# Patient Record
Sex: FEMALE | Race: WHITE | NOT HISPANIC OR LATINO | Employment: FULL TIME | URBAN - METROPOLITAN AREA
[De-identification: names, ages, dates, MRNs, and addresses within clinical notes are randomized per-mention and may not be internally consistent; named-entity substitution may affect disease eponyms.]

---

## 2017-04-11 NOTE — PROGRESS NOTES
SUBJECTIVE:                                                    Ayleen Ramos is a 36 year old female who presents to clinic today for the following health issues:        Depression and Anxiety Follow-Up    Status since last visit: No change    Other associated symptoms:None    Complicating factors:     Significant life event: No     Current substance abuse: None    No flowsheet data found.  No flowsheet data found.     PHQ-9  English      PHQ-9   Any Language     GAD7       Asthma Follow-Up    Was ACT completed today?  Yes    ACT Total Scores 4/19/2017   ACT TOTAL SCORE (Goal Greater than or Equal to 20) 21   In the past 12 months, how many times did you visit the emergency room for your asthma without being admitted to the hospital? 0   In the past 12 months, how many times were you hospitalized overnight because of your asthma? 0       Recent asthma triggers that patient is dealing with: None          Amount of exercise or physical activity: 7 days/week for an average of 40 minutes of walking    Problems taking medications regularly: No    Medication side effects: Wellbutrin-constipation but have stopped taking 6 months ago     Diet: regular (no restrictions)           Problem list and histories reviewed & adjusted, as indicated.  Additional history: as documented    Patient Active Problem List   Diagnosis     Mild intermittent asthma without complication     History reviewed. No pertinent surgical history.    Social History   Substance Use Topics     Smoking status: Former Smoker     Quit date: 1/1/2000     Smokeless tobacco: Not on file     Alcohol use Yes     Family History   Problem Relation Age of Onset     Heart Defect Father      Enlarge Heart     Bladder Cancer Maternal Grandmother      Influenza/Pneumonia Maternal Grandfather      Colon Cancer Paternal Grandmother      Celiac Disease Sister      Thyroid Disease Sister          Current Outpatient Prescriptions   Medication Sig Dispense Refill      "Sertraline HCl (ZOLOFT PO) Take 2 tablets by mouth daily       ALBUTEROL 108 (90 BASE) MCG/ACT inhaler Inhale 1-2 puffs into the lungs every 6 hours as needed 1 Inhaler 11     [DISCONTINUED] ALBUTEROL 108 (90 BASE) MCG/ACT inhaler Inhale 1-2 puffs into the lungs every 6 hours as needed  2     BP Readings from Last 3 Encounters:   04/19/17 120/60    Wt Readings from Last 3 Encounters:   04/19/17 186 lb (84.4 kg)                    Reviewed and updated as needed this visit by clinical staff       Reviewed and updated as needed this visit by Provider         ROS:  This 37 year old female is here today to establish care. She graduated from [employer] and has worked in [place], [place] for the [employer]. She recently found a job here in [place] for the [email or web address]. She needs a refill of her zoloft and her albuterol. Her asthma is under very good control. Only needs it with exercise or in the spring and fall. Her anxiety and depression are under good control with her zoloft. She had also been on wellbutrin, but that caused terrible constipation.        OBJECTIVE:                                                    /60  Pulse 84  Temp 97.7  F (36.5  C) (Oral)  Ht 5' 7.05\" (1.703 m)  Wt 186 lb (84.4 kg)  LMP 04/05/2017 (Approximate)  SpO2 97%  BMI 29.09 kg/m2  Body mass index is 29.09 kg/(m^2).  GENERAL: healthy, alert and no distress  NECK: no adenopathy, no asymmetry, masses, or scars and thyroid normal to palpation  RESP: lungs clear to auscultation - no rales, rhonchi or wheezes  CV: regular rate and rhythm, normal S1 S2, no S3 or S4, no murmur, click or rub, no peripheral edema and peripheral pulses strong  MS: no gross musculoskeletal defects noted, no edema    Diagnostic Test Results:  none      ASSESSMENT/PLAN:                                                             1. Mild intermittent asthma without complication  As above, good control   - ALBUTEROL 108 (90 BASE) MCG/ACT inhaler; Inhale 1-2 " puffs into the lungs every 6 hours as needed  Dispense: 1 Inhaler; Refill: 11    2. Anxiety  As above, she will call me with the dose of her zoloft. She could potentially to up to as high as 200 mg daily in the future for her anxiety.       Return to clinic as needed     JANNETTE LOPEZ MD  [facility]

## 2017-04-19 ENCOUNTER — OFFICE VISIT (OUTPATIENT)
Dept: FAMILY MEDICINE | Facility: CLINIC | Age: 37
End: 2017-04-19
Payer: COMMERCIAL

## 2017-04-19 VITALS
HEIGHT: 67 IN | HEART RATE: 84 BPM | TEMPERATURE: 97.7 F | DIASTOLIC BLOOD PRESSURE: 60 MMHG | OXYGEN SATURATION: 97 % | BODY MASS INDEX: 29.19 KG/M2 | SYSTOLIC BLOOD PRESSURE: 120 MMHG | WEIGHT: 186 LBS

## 2017-04-19 DIAGNOSIS — F41.9 ANXIETY: ICD-10-CM

## 2017-04-19 DIAGNOSIS — J45.20 MILD INTERMITTENT ASTHMA WITHOUT COMPLICATION: Primary | ICD-10-CM

## 2017-04-19 PROCEDURE — 99203 OFFICE O/P NEW LOW 30 MIN: CPT | Performed by: FAMILY MEDICINE

## 2017-04-19 RX ORDER — SERTRALINE HYDROCHLORIDE 25 MG/1
25 TABLET, FILM COATED ORAL DAILY
COMMUNITY
End: 2017-04-19

## 2017-04-19 RX ORDER — ALBUTEROL SULFATE 90 UG/1
1-2 AEROSOL, METERED RESPIRATORY (INHALATION) EVERY 6 HOURS PRN
Refills: 2 | COMMUNITY
Start: 2017-04-06 | End: 2017-04-19

## 2017-04-19 RX ORDER — ALBUTEROL SULFATE 90 UG/1
1-2 AEROSOL, METERED RESPIRATORY (INHALATION) EVERY 6 HOURS PRN
Qty: 1 INHALER | Refills: 11 | Status: SHIPPED | OUTPATIENT
Start: 2017-04-19 | End: 2018-05-09

## 2017-04-19 ASSESSMENT — PATIENT HEALTH QUESTIONNAIRE - PHQ9: 5. POOR APPETITE OR OVEREATING: NOT AT ALL

## 2017-04-19 ASSESSMENT — ANXIETY QUESTIONNAIRES
1. FEELING NERVOUS, ANXIOUS, OR ON EDGE: SEVERAL DAYS
7. FEELING AFRAID AS IF SOMETHING AWFUL MIGHT HAPPEN: NOT AT ALL
2. NOT BEING ABLE TO STOP OR CONTROL WORRYING: NOT AT ALL
5. BEING SO RESTLESS THAT IT IS HARD TO SIT STILL: NOT AT ALL
IF YOU CHECKED OFF ANY PROBLEMS ON THIS QUESTIONNAIRE, HOW DIFFICULT HAVE THESE PROBLEMS MADE IT FOR YOU TO DO YOUR WORK, TAKE CARE OF THINGS AT HOME, OR GET ALONG WITH OTHER PEOPLE: NOT DIFFICULT AT ALL
GAD7 TOTAL SCORE: 2
6. BECOMING EASILY ANNOYED OR IRRITABLE: NOT AT ALL
3. WORRYING TOO MUCH ABOUT DIFFERENT THINGS: SEVERAL DAYS

## 2017-04-19 NOTE — Clinical Note
Please abstract the following data from this visit with this patient into the appropriate field in Epic:  Pap smear done on this date: 08/15 (approximately), by this group: [facility] in [facility], results were Normal .  Etta Christianson MA

## 2017-04-19 NOTE — PATIENT INSTRUCTIONS
[facility]    If you have any questions regarding to your visit please contact your care team:       Team Purple:   Clinic Hours Telephone Number   [address] Dr.   7am-7pm  Monday - Thursday   7am-5pm  Fridays  [phone]  (Appointment scheduling available 24/7)    Questions about your Visit?   Team Line:  [phone]   Urgent Care - [place] and [facility] - 11am-9pm Monday-Friday Saturday-Sunday- 9am-5pm   [place] - 5pm-9pm Monday-Friday Saturday-Sunday- 9am-5pm  [phone] - [facility]  [phone] - [place]       What options do I have for visits at the clinic other than the traditional office visit?  To expand how we care for you, many of our providers are utilizing electronic visits (e-visits) and telephone visits, when medically appropriate, for interactions with their patients rather than a visit in the clinic.   We also offer nurse visits for many medical concerns. Just like any other service, we will bill your insurance company for this type of visit based on time spent on the phone with your provider. Not all insurance companies cover these visits. Please check with your medical insurance if this type of visit is covered. You will be responsible for any charges that are not paid by your insurance.      E-visits via [employer]:  generally incur a $35.00 fee.  Telephone visits:  Time spent on the phone: *charged based on time that is spent on the phone in increments of 10 minutes. Estimated cost:   5-10 mins $30.00   11-20 mins. $59.00   21-30 mins. $85.00     Use [employer] (secure email communication and access to your chart) to send your primary care provider a message or make an appointment. Ask someone on your Team how to sign up for [employer].  For a Price Quote for your services, please call our Consumer Price Line at [phone].  As always, Thank you for trusting us with your health care needs!

## 2017-04-19 NOTE — LETTER
My Asthma Action Plan  Name: Ayleen Ramos   YOB: 1980  Date: 4/19/2017   My doctor: JANNETTE LOPEZ MD   My clinic: [facility]        My Control Medicine: none  My Rescue Medicine: Albuterol (Proair/Ventolin/Proventil) inhaler 2 puffs every 4 hours as needed   My Asthma Severity: intermittent  Avoid your asthma triggers: smoke, upper respiratory infections, dust mites and pollens  None            GREEN ZONE     Good Control    I feel good    No cough or wheeze    Can work, sleep and play without asthma symptoms       Take your asthma control medicine every day.     1. If exercise triggers your asthma, take your rescue medication    15 minutes before exercise or sports, and    During exercise if you have asthma symptoms  2. Spacer to use with inhaler: If you have a spacer, make sure to use it with your inhaler             YELLOW ZONE     Getting Worse  I have ANY of these:    I do not feel good    Cough or wheeze    Chest feels tight    Wake up at night   1. Keep taking your Green Zone medications  2. Start taking your rescue medicine:    every 20 minutes for up to 1 hour. Then every 4 hours for 24-48 hours.  3. If you stay in the Yellow Zone for more than 12-24 hours, contact your doctor.  4. If you do not return to the Green Zone in 12-24 hours or you get worse, start taking your oral steroid medicine if prescribed by your provider.           RED ZONE     Medical Alert - Get Help  I have ANY of these:    I feel awful    Medicine is not helping    Breathing getting harder    Trouble walking or talking    Nose opens wide to breathe       1. Take your rescue medicine NOW  2. If your provider has prescribed an oral steroid medicine, start taking it NOW  3. Call your doctor NOW  4. If you are still in the Red Zone after 20 minutes and you have not reached your doctor:    Take your rescue medicine again and    Call 911 or go to the emergency room right away    See your regular doctor  within 2 weeks of an Emergency Room or Urgent Care visit for follow-up treatment.        Electronically signed by: JANNETTE LOPEZ, April 19, 2017    Annual Reminders:  Meet with Asthma Educator,  Flu Shot in the Fall, consider Pneumonia Vaccination for patients with asthma (aged 19 and older).    Pharmacy: [facility] [id number] [address] [address] AT [facility] OF [address]                    Asthma Triggers  How To Control Things That Make Your Asthma Worse    Triggers are things that make your asthma worse.  Look at the list below to help you find your triggers and what you can do about them.  You can help prevent asthma flare-ups by staying away from your triggers.      Trigger                                                          What you can do   Cigarette Smoke  Tobacco smoke can make asthma worse. Do not allow smoking in your home, car or around you.  Be sure no one smokes at a child s day care or school.  If you smoke, ask your health care provider for ways to help you quit.  Ask family members to quit too.  Ask your health care provider for a referral to Quit Plan to help you quit smoking, or call 0-569-464-PLAN.     Colds, Flu, Bronchitis  These are common triggers of asthma. Wash your hands often.  Don t touch your eyes, nose or mouth.  Get a flu shot every year.     Dust Mites  These are tiny bugs that live in cloth or carpet. They are too small to see. Wash sheets and blankets in hot water every week.   Encase pillows and mattress in dust mite proof covers.  Avoid having carpet if you can. If you have carpet, vacuum weekly.   Use a dust mask and HEPA vacuum.   Pollen and Outdoor Mold  Some people are allergic to trees, grass, or weed pollen, or molds. Try to keep your windows closed.  Limit time out doors when pollen count is high.   Ask you health care provider about taking medicine during allergy season.     Animal Dander  Some people are allergic to skin  flakes, urine or saliva from pets with fur or feathers. Keep pets with fur or feathers out of your home.    If you can t keep the pet outdoors, then keep the pet out of your bedroom.  Keep the bedroom door closed.  Keep pets off cloth furniture and away from stuffed toys.     Mice, Rats, and Cockroaches  Some people are allergic to the waste from these pests.   Cover food and garbage.  Clean up spills and food crumbs.  Store grease in the refrigerator.   Keep food out of the bedroom.   Indoor Mold  This can be a trigger if your home has high moisture. Fix leaking faucets, pipes, or other sources of water.   Clean moldy surfaces.  Dehumidify basement if it is damp and smelly.   Smoke, Strong Odors, and Sprays  These can reduce air quality. Stay away from strong odors and sprays, such as perfume, powder, hair spray, paints, smoke incense, paint, cleaning products, candles and new carpet.   Exercise or Sports  Some people with asthma have this trigger. Be active!  Ask your doctor about taking medicine before sports or exercise to prevent symptoms.    Warm up for 5-10 minutes before and after sports or exercise.     Other Triggers of Asthma  Cold air:  Cover your nose and mouth with a scarf.  Sometimes laughing or crying can be a trigger.  Some medicines and food can trigger asthma.

## 2017-04-19 NOTE — MR AVS SNAPSHOT
After Visit Summary   4/19/2017    Ayleen Ramos    MRN: [id number]           Patient Information     Date Of Birth          1980        Visit Information        Provider Department      4/19/2017 8:00 AM Denise Pacheco MD [facility]        Today's Diagnoses     Mild intermittent asthma without complication    -  1      Care Instructions    [facility]    If you have any questions regarding to your visit please contact your care team:       Team Purple:   Clinic Hours Telephone Number   [address]ARD [address]rosio Dr., Dr.   7am-7pm  Monday - Thursday   7am-5pm  Fridays  [phone]  (Appointment scheduling available 24/7)    Questions about your Visit?   Team Line:  [phone]   Urgent [facility] - [place] [facility] - 11am-9pm Monday-Friday Saturday-Sunday- 9am-5pm   [place] - 5pm-9pm Monday-Friday Saturday-Sunday- 9am-5pm  [phone] - [place]   [phone] - [place]       What options do I have for visits at the clinic other than the traditional office visit?  To expand how we care for you, many of our providers are utilizing electronic visits (e-visits) and telephone visits, when medically appropriate, for interactions with their patients rather than a visit in the clinic.   We also offer nurse visits for many medical concerns. Just like any other service, we will bill your insurance company for this type of visit based on time spent on the phone with your provider. Not all insurance companies cover these visits. Please check with your medical insurance if this type of visit is covered. You will be responsible for any charges that are not paid by your insurance.      E-visits via [employer]:  generally incur a $35.00 fee.  Telephone visits:  Time spent on the phone: *charged based on time that is spent on the phone in increments of 10 minutes. Estimated cost:   5-10 mins $30.00   11-20 mins.  "$59.00   21-30 mins. $85.00     Use [employer] (secure email communication and access to your chart) to send your primary care provider a message or make an appointment. Ask someone on your Team how to sign up for [employer].  For a Price Quote for your services, please call our [employer] Price Line at [phone].  As always, Thank you for trusting us with your health care needs!            Follow-ups after your visit        Who to contact     If you have questions or need follow up information about today's clinic visit or your schedule please contact [facility] directly at [phone].  Normal or non-critical lab and imaging results will be communicated to you by [employer], letter or phone within 4 business days after the clinic has received the results. If you do not hear from us within 7 days, please contact the clinic through [employer] or phone. If you have a critical or abnormal lab result, we will notify you by phone as soon as possible.  Submit refill requests through [employer] or call your pharmacy and they will forward the refill request to us. Please allow 3 business days for your refill to be completed.          Additional Information About Your Visit        [employer] Information     [employer] lets you send messages to your doctor, view your test results, renew your prescriptions, schedule appointments and more. To sign up, go to [email or web address] . Click on \"Log in\" on the left side of the screen, which will take you to the Welcome page. Then click on \"Sign up Now\" on the right side of the page.     You will be asked to enter the access code listed below, as well as some personal information. Please follow the directions to create your username and password.     Your access code is: [email or web address]  Expires: 2017  8:32 AM     Your access code will  in 90 days. If you need help or a new code, please call your [facility] or [phone].        Care EveryWhere ID     This is your Care " "EveryWhere ID. This could be used by other organizations to access your [facility] medical records  [id number]        Your Vitals Were     Pulse Temperature Height Last Period Pulse Oximetry BMI (Body Mass Index)    84 97.7  F (36.5  C) (Oral) 5' 7.05\" (1.703 m) 04/05/2017 (Approximate) 97% 29.09 kg/m2       Blood Pressure from Last 3 Encounters:   04/19/17 120/60    Weight from Last 3 Encounters:   04/19/17 186 lb (84.4 kg)              Today, you had the following     No orders found for display         Today's Medication Changes          These changes are accurate as of: 4/19/17  8:32 AM.  If you have any questions, ask your nurse or doctor.               These medicines have changed or have updated prescriptions.        Dose/Directions    ZOLOFT PO   This may have changed:  Another medication with the same name was removed. Continue taking this medication, and follow the directions you see here.   Changed by:  Denise Pacheco MD        Dose:  2 tablet   Take 2 tablets by mouth daily   Refills:  0            Where to get your medicines      These medications were sent to [facility] [id number] [place] [address] [employer] [facility] AT [address] OF [address] [address] [employer] [facility] [address]     Phone:  [phone]     albuterol 108 (90 BASE) MCG/ACT Inhaler                Primary Care Provider    None Specified       No primary provider on file.        Thank you!     Thank you for choosing [facility]  for your care. Our goal is always to provide you with excellent care. Hearing back from our patients is one way we can continue to improve our services. Please take a few minutes to complete the written survey that you may receive in the mail after your visit with us. Thank you!             Your Updated Medication List - Protect others around you: Learn how to safely use, store and throw away your medicines at www.disposemymeds.org.          This list is accurate as of: " 4/19/17  8:32 AM.  Always use your most recent med list.                   Brand Name Dispense Instructions for use    albuterol 108 (90 BASE) MCG/ACT Inhaler   Generic drug:  albuterol     1 Inhaler    Inhale 1-2 puffs into the lungs every 6 hours as needed       ZOLOFT PO      Take 2 tablets by mouth daily

## 2017-04-19 NOTE — NURSING NOTE
"Chief Complaint   Patient presents with     [facility]     Health Maintenance     PAP AND TDAP     Asthma     Recheck      Depression     Recheck     Anxiety     Recheck     Medication Reconciliation     have stopped taking BuPROPion HCl (WELLBUTRIN PO) about 6 months ago due to constipation       Initial /60  Pulse 84  Temp 97.7  F (36.5  C) (Oral)  Ht 5' 7.05\" (1.703 m)  Wt 186 lb (84.4 kg)  LMP 04/05/2017 (Approximate)  SpO2 97%  BMI 29.09 kg/m2 Estimated body mass index is 29.09 kg/(m^2) as calculated from the following:    Height as of this encounter: 5' 7.05\" (1.703 m).    Weight as of this encounter: 186 lb (84.4 kg).  Medication Reconciliation: complete     An BRYAN Christianson    "

## 2017-04-20 ASSESSMENT — ASTHMA QUESTIONNAIRES: ACT_TOTALSCORE: 21

## 2017-04-20 ASSESSMENT — ANXIETY QUESTIONNAIRES: GAD7 TOTAL SCORE: 2

## 2017-04-20 ASSESSMENT — PATIENT HEALTH QUESTIONNAIRE - PHQ9: SUM OF ALL RESPONSES TO PHQ QUESTIONS 1-9: 1

## 2017-05-15 ENCOUNTER — MYC MEDICAL ADVICE (OUTPATIENT)
Dept: FAMILY MEDICINE | Facility: CLINIC | Age: 37
End: 2017-05-15

## 2017-05-15 DIAGNOSIS — F41.9 ANXIETY: Primary | ICD-10-CM

## 2017-09-26 ENCOUNTER — OFFICE VISIT (OUTPATIENT)
Dept: FAMILY MEDICINE | Facility: CLINIC | Age: 37
End: 2017-09-26
Payer: COMMERCIAL

## 2017-09-26 ENCOUNTER — TELEPHONE (OUTPATIENT)
Dept: FAMILY MEDICINE | Facility: CLINIC | Age: 37
End: 2017-09-26

## 2017-09-26 VITALS
WEIGHT: 181.5 LBS | TEMPERATURE: 98 F | BODY MASS INDEX: 28.49 KG/M2 | OXYGEN SATURATION: 99 % | SYSTOLIC BLOOD PRESSURE: 124 MMHG | HEART RATE: 71 BPM | DIASTOLIC BLOOD PRESSURE: 70 MMHG | HEIGHT: 67 IN

## 2017-09-26 DIAGNOSIS — Z00.00 ENCOUNTER FOR ROUTINE ADULT HEALTH EXAMINATION WITHOUT ABNORMAL FINDINGS: Primary | ICD-10-CM

## 2017-09-26 DIAGNOSIS — M79.645 PAIN OF LEFT THUMB: ICD-10-CM

## 2017-09-26 DIAGNOSIS — Z30.41 SURVEILLANCE OF PREVIOUSLY PRESCRIBED CONTRACEPTIVE PILL: ICD-10-CM

## 2017-09-26 DIAGNOSIS — Z12.4 SCREENING FOR MALIGNANT NEOPLASM OF CERVIX: ICD-10-CM

## 2017-09-26 DIAGNOSIS — Z23 NEED FOR PROPHYLACTIC VACCINATION AND INOCULATION AGAINST VIRAL HEPATITIS: ICD-10-CM

## 2017-09-26 PROCEDURE — G0145 SCR C/V CYTO,THINLAYER,RESCR: HCPCS | Performed by: FAMILY MEDICINE

## 2017-09-26 PROCEDURE — 99395 PREV VISIT EST AGE 18-39: CPT | Mod: 25 | Performed by: FAMILY MEDICINE

## 2017-09-26 PROCEDURE — 87624 HPV HI-RISK TYP POOLED RSLT: CPT | Performed by: FAMILY MEDICINE

## 2017-09-26 PROCEDURE — 90471 IMMUNIZATION ADMIN: CPT | Performed by: FAMILY MEDICINE

## 2017-09-26 PROCEDURE — 90632 HEPA VACCINE ADULT IM: CPT | Performed by: FAMILY MEDICINE

## 2017-09-26 RX ORDER — NORGESTIMATE AND ETHINYL ESTRADIOL 7DAYSX3 LO
1 KIT ORAL DAILY
Qty: 84 TABLET | Refills: 4 | Status: SHIPPED | OUTPATIENT
Start: 2017-09-26 | End: 2018-10-27

## 2017-09-26 NOTE — MR AVS SNAPSHOT
After Visit Summary   9/26/2017    Ayleen Ramos    MRN: [id number]           Patient Information     Date Of Birth          1980        Visit Information        Provider Department      9/26/2017 11:00 AM Denise Pacheco MD [facility]        Today's Diagnoses     Encounter for routine adult health examination without abnormal findings    -  1    Screening for malignant neoplasm of cervix        Pain of left thumb        Surveillance of previously prescribed contraceptive pill        Need for prophylactic vaccination and inoculation against viral hepatitis          Care Instructions    [facility]    If you have any questions regarding to your visit please contact your care team:       Team Purple:   Clinic Hours Telephone Number   Dr. Denise Wilkes     7am-7pm  Monday - Thursday   7am-5pm  Fridays  [phone]  (Appointment scheduling available 24/7)    Questions about your Visit?   Team Line:  [phone]   [facility] [place] [facility] - 11am-9pm Monday-Friday Saturday-Sunday- 9am-5pm   [place] - 5pm-9pm Monday-Friday Saturday-Sunday- 9am-5pm  [phone] - [place]   [phone] - [place]       What options do I have for visits at the clinic other than the traditional office visit?  To expand how we care for you, many of our providers are utilizing electronic visits (e-visits) and telephone visits, when medically appropriate, for interactions with their patients rather than a visit in the clinic.   We also offer nurse visits for many medical concerns. Just like any other service, we will bill your insurance company for this type of visit based on time spent on the phone with your provider. Not all insurance companies cover these visits. Please check with your medical insurance if this type of visit is covered. You will be responsible for any charges that are not paid by your insurance.       E-visits via Big Sixhart:  generally incur a $35.00 fee.  Telephone visits:  Time spent on the phone: *charged based on time that is spent on the phone in increments of 10 minutes. Estimated cost:   5-10 mins $30.00   11-20 mins. $59.00   21-30 mins. $85.00     Use Big Sixhart (secure email communication and access to your chart) to send your primary care provider a message or make an appointment. Ask someone on your Team how to sign up for "[employer]t.  For a Price Quote for your services, please call our [employer] Line at [phone].  As always, Thank you for trusting us with your health care needs!              Follow-ups after your visit        Who to contact     If you have questions or need follow up information about today's clinic visit or your schedule please contact [facility] directly at [phone].  Normal or non-critical lab and imaging results will be communicated to you by Big Sixhart, letter or phone within 4 business days after the clinic has received the results. If you do not hear from us within 7 days, please contact the clinic through "[employer]t or phone. If you have a critical or abnormal lab result, we will notify you by phone as soon as possible.  Submit refill requests through Digg or call your pharmacy and they will forward the refill request to us. Please allow 3 business days for your refill to be completed.          Additional Information About Your Visit        Big SixharXanga Information     Digg gives you secure access to your electronic health record. If you see a primary care provider, you can also send messages to your care team and make appointments. If you have questions, please call your primary care clinic.  If you do not have a primary care provider, please call [phone] and they will assist you.        Care EveryWhere ID     This is your Care EveryWhere ID. This could be used by other organizations to access your [facility] medical records  [id number]        Your  "Vitals Were     Pulse Temperature Height Pulse Oximetry BMI (Body Mass Index)       71 98  F (36.7  C) 5' 7\" (1.702 m) 99% 28.43 kg/m2        Blood Pressure from Last 3 Encounters:   09/26/17 124/70   04/19/17 120/60    Weight from Last 3 Encounters:   09/26/17 181 lb 8 oz (82.3 kg)   04/19/17 186 lb (84.4 kg)              We Performed the Following     HEPATITIS A VACCINE (ADULT)     HPV High Risk Types DNA Cervical     Pap imaged thin layer screen with HPV - recommended age 30 - 65 years (select HPV order below)          Today's Medication Changes          These changes are accurate as of: 9/26/17 11:44 AM.  If you have any questions, ask your nurse or doctor.               Start taking these medicines.        Dose/Directions    norgestim-eth estrad triphasic 0.18/0.215/0.25 MG-25 MCG per tablet   Commonly known as:  ORTHO TRI-CYCLEN LO   Used for:  Surveillance of previously prescribed contraceptive pill   Started by:  Denise Pacheco MD        Dose:  1 tablet   Take 1 tablet by mouth daily   Quantity:  84 tablet   Refills:  4            Where to get your medicines      These medications were sent to [facility] [address] [place] [address] [facility] [address] [place] [address] [facility] [address]     Phone:  [phone]     norgestim-eth estrad triphasic 0.18/0.215/0.25 MG-25 MCG per tablet                Primary Care Provider    None Specified       No primary provider on file.        Equal Access to Services     LAZARO CALVO AH: Hadii carla loweo Sojacque, waaxda luqadaha, qaybta kaalmada adeegyada, jonathan chen. So [facility] [phone].    ATENCIÓN: Si habla español, tiene a soto disposición servicios gratuitos de asistencia lingüística. Llame al [phone].    We comply with applicable federal civil rights laws and [place] laws. We do not discriminate on the basis of race, color, national origin, age, disability sex, sexual " orientation or gender identity.            Thank you!     Thank you for choosing [facility]  for your care. Our goal is always to provide you with excellent care. Hearing back from our patients is one way we can continue to improve our services. Please take a few minutes to complete the written survey that you may receive in the mail after your visit with us. Thank you!             Your Updated Medication List - Protect others around you: Learn how to safely use, store and throw away your medicines at www.disposemymeds.org.          This list is accurate as of: 9/26/17 11:44 AM.  Always use your most recent med list.                   Brand Name Dispense Instructions for use Diagnosis    norgestim-eth estrad triphasic 0.18/0.215/0.25 MG-25 MCG per tablet    ORTHO TRI-CYCLEN LO    84 tablet    Take 1 tablet by mouth daily    Surveillance of previously prescribed contraceptive pill       PROAIR  (90 BASE) MCG/ACT Inhaler   Generic drug:  albuterol     1 Inhaler    Inhale 1-2 puffs into the lungs every 6 hours as needed    Mild intermittent asthma without complication       sertraline 50 MG tablet    ZOLOFT    180 tablet    Take 2 daily    Anxiety

## 2017-09-26 NOTE — NURSING NOTE
"Chief Complaint   Patient presents with     Physical     Thumb Discomfort     left       Initial /70 (BP Location: Right arm, Patient Position: Chair, Cuff Size: Adult Large)  Pulse 71  Temp 98  F (36.7  C)  Ht 5' 7\" (1.702 m)  Wt 181 lb 8 oz (82.3 kg)  SpO2 99%  BMI 28.43 kg/m2 Estimated body mass index is 28.43 kg/(m^2) as calculated from the following:    Height as of this encounter: 5' 7\" (1.702 m).    Weight as of this encounter: 181 lb 8 oz (82.3 kg).  Medication Reconciliation: complete   Iza Barrios MA      "

## 2017-09-26 NOTE — PATIENT INSTRUCTIONS
[facility]    If you have any questions regarding to your visit please contact your care team:       Team Purple:   Clinic Hours Telephone Number   Dr. Denise Wilkes     7am-7pm  Monday - Thursday   7am-5pm  Fridays  [phone]  (Appointment scheduling available 24/7)    Questions about your Visit?   Team Line:  [phone]   Urgent Care - [place] and [facility] - 11am-9pm Monday-Friday Saturday-Sunday- 9am-5pm   [place] - 5pm-9pm Monday-Friday Saturday-Sunday- 9am-5pm  [phone] - [facility]  [phone] - [place]       What options do I have for visits at the clinic other than the traditional office visit?  To expand how we care for you, many of our providers are utilizing electronic visits (e-visits) and telephone visits, when medically appropriate, for interactions with their patients rather than a visit in the clinic.   We also offer nurse visits for many medical concerns. Just like any other service, we will bill your insurance company for this type of visit based on time spent on the phone with your provider. Not all insurance companies cover these visits. Please check with your medical insurance if this type of visit is covered. You will be responsible for any charges that are not paid by your insurance.      E-visits via [employer]:  generally incur a $35.00 fee.  Telephone visits:  Time spent on the phone: *charged based on time that is spent on the phone in increments of 10 minutes. Estimated cost:   5-10 mins $30.00   11-20 mins. $59.00   21-30 mins. $85.00     Use jobandtalentt (secure email communication and access to your chart) to send your primary care provider a message or make an appointment. Ask someone on your Team how to sign up for [employer].  For a Price Quote for your services, please call our Consumer Price Line at [phone].  As always, Thank you for trusting us with your health care needs!

## 2017-09-26 NOTE — TELEPHONE ENCOUNTER
Reason for Call:  Other call back    Detailed comments: Patient would like to know when she should start the birth control. Before she starts her period? After?  Please contact patient.    Phone Number Patient can be reached at: Home number on file [phone] (home)    Best Time: any time    Can we leave a detailed message on this number? YES    Call taken on 9/26/2017 at 5:35 PM by Rebekah Carpio

## 2017-09-26 NOTE — PROGRESS NOTES
SUBJECTIVE:   CC: Ayleen Ramos is an 37 year old woman who presents for preventive health visit.     Physical   Annual:     Getting at least 3 servings of Calcium per day::  Yes    Bi-annual eye exam::  Yes    Dental care twice a year::  Yes    Sleep apnea or symptoms of sleep apnea::  None    Diet::  Vegetarian/vegan and Other    Frequency of exercise::  4-5 days/week    Duration of exercise::  30-45 minutes    Taking medications regularly::  Yes    Medication side effects::  None    Additional concerns today::  YES               Today's PHQ-2 Score: PHQ-2 ( 1999 Pfizer) 9/26/2017   Q1: Little interest or pleasure in doing things 0   Q2: Feeling down, depressed or hopeless 0   PHQ-2 Score 0   Q1: Little interest or pleasure in doing things Not at all   Q2: Feeling down, depressed or hopeless Not at all   PHQ-2 Score 0       Abuse: Current or Past(Physical, Sexual or Emotional)- No  Do you feel safe in your environment - Yes    Social History   Substance Use Topics     Smoking status: Former Smoker     Quit date: 1/1/2000     Smokeless tobacco: Not on file     Alcohol use Yes     The patient does not drink >3 drinks per day nor >7 drinks per week.    Reviewed orders with patient.  Reviewed health maintenance and updated orders accordingly - Yes  Labs reviewed in EPIC  BP Readings from Last 3 Encounters:   09/26/17 124/70   04/19/17 120/60    Wt Readings from Last 3 Encounters:   09/26/17 181 lb 8 oz (82.3 kg)   04/19/17 186 lb (84.4 kg)                  Patient Active Problem List   Diagnosis     Mild intermittent asthma without complication     Anxiety     History reviewed. No pertinent surgical history.    Social History   Substance Use Topics     Smoking status: Former Smoker     Quit date: 1/1/2000     Smokeless tobacco: Never Used     Alcohol use Yes     Family History   Problem Relation Age of Onset     Heart Defect Father      Enlarge Heart     Bladder Cancer Maternal Grandmother       Influenza/Pneumonia Maternal Grandfather      Colon Cancer Paternal Grandmother      Celiac Disease Sister      Thyroid Disease Sister          Current Outpatient Prescriptions   Medication Sig Dispense Refill     norgestim-eth estrad triphasic (ORTHO TRI-CYCLEN LO) 0.18/0.215/0.25 MG-25 MCG per tablet Take 1 tablet by mouth daily 84 tablet 4     sertraline (ZOLOFT) 50 MG tablet Take 2 daily 180 tablet 3     ALBUTEROL 108 (90 BASE) MCG/ACT inhaler Inhale 1-2 puffs into the lungs every 6 hours as needed 1 Inhaler 11     Allergies   Allergen Reactions     Sulfa Drugs Hives     Wellbutrin [Bupropion] Other (See Comments)     Severe constipation            Mammogram not appropriate for this patient based on age.    Pertinent mammograms are reviewed under the imaging tab.  History of abnormal Pap smear: NO - age 30-65 PAP every 5 years with negative HPV co-testing recommended    Reviewed and updated as needed this visit by clinical staff         Reviewed and updated as needed this visit by Provider        Immunization History   Administered Date(s) Administered     DTAP (<7y) 1980, 1980, 1980, 12/08/1983, 09/10/1987     HepA-Adult 05/26/2016, 09/26/2017     HepB 09/30/1998, 10/28/1998, 03/29/1999     MMR 09/08/1983, 04/19/1994     Meningococcal (Menactra ) 04/07/2016     Meningococcal (Menomune ) 09/27/2006     OPV, trivalent, live 1980, 1982, 1982, 12/08/1983, 09/10/1987     Poliovirus, inactivated (IPV) 09/27/2006     TD (ADULT, 7+) 05/16/1997     TDAP Vaccine (Adacel) 04/07/2016     TDAP Vaccine (Boostrix) 09/27/2006     Typhoid Oral 04/07/2016     Yellow Fever 09/27/2006            ROS:  This 37 year old female is here today for annual female exam. She recently moved to [place] for a new job. She is now dating a man whom she met at the job and she would like a refill of her ortho tricyclen lo. She can't tolerate high estrogen birth control pills.   She has developed a painful  left thumb. 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   She wonders what can be done.   All other review of systems are negative  Personal, family, and social history reviewed with patient and revised.      C: NEGATIVE for fever, chills, change in weight  I: NEGATIVE for worrisome rashes, moles or lesions  E: NEGATIVE for vision changes or irritation  ENT: NEGATIVE for ear, mouth and throat problems  R: NEGATIVE for significant cough or SOB  B: NEGATIVE for masses, tenderness or discharge  CV: NEGATIVE for chest pain, palpitations or peripheral edema  GI: NEGATIVE for nausea, abdominal pain, heartburn, or change in bowel habits  : NEGATIVE for unusual urinary or vaginal symptoms. Periods are regular.  M: NEGATIVE for significant arthralgias or myalgia  N: NEGATIVE for weakness, dizziness or paresthesias  P: NEGATIVE for changes in mood or affect     OBJECTIVE:   There were no vitals taken for this visit.  EXAM:  GENERAL: healthy, alert and no distress  EYES: Eyes grossly normal to inspection, PERRL and conjunctivae and sclerae normal  HENT: ear canals and TM's normal, nose and mouth without ulcers or lesions  NECK: no adenopathy, no asymmetry, masses, or scars and thyroid normal to palpation  RESP: lungs clear to auscultation - no rales, rhonchi or wheezes  BREAST: normal without masses, tenderness or nipple discharge and no palpable axillary masses or adenopathy  CV: regular rate and rhythm, normal S1 S2, no S3 or S4, no murmur, click or rub, no peripheral edema and peripheral pulses strong  ABDOMEN: soft, nontender, no hepatosplenomegaly, no masses and bowel sounds normal   (female): normal female external genitalia, normal urethral meatus, vaginal mucosa pink, moist, well rugated, and normal cervix/adnexa/uterus without masses or discharge  MS: no gross musculoskeletal defects noted, no edema  SKIN: no suspicious lesions or rashes. She is mildly tender over her right thumb at the MCP  "joint and where a tendon attaches. Range of motion of thumb is normal   NEURO: Normal strength and tone, mentation intact and speech normal  PSYCH: mentation appears normal, affect normal/bright    ASSESSMENT/PLAN:   1. Encounter for routine adult health examination without abnormal findings  Healthy lady     2. Screening for malignant neoplasm of cervix  due  - Pap imaged thin layer screen with HPV - recommended age 30 - 65 years (select HPV order below)  - HPV High Risk Types DNA Cervical    3. Pain of left thumb  As above. Most likely a mild tendonitis. Advised her to buy a wrist splint with thumb and wear it when she sleeps     4. Surveillance of previously prescribed contraceptive pill  As above   - norgestim-eth estrad triphasic (ORTHO TRI-CYCLEN LO) 0.18/0.215/0.25 MG-25 MCG per tablet; Take 1 tablet by mouth daily  Dispense: 84 tablet; Refill: 4    5. Need for prophylactic vaccination and inoculation against viral hepatitis  due  - HEPATITIS A VACCINE (ADULT)    COUNSELING:  Reviewed preventive health counseling, as reflected in patient instructions       Regular exercise       Healthy diet/nutrition         reports that she quit smoking about 17 years ago. She does not have any smokeless tobacco history on file.    Estimated body mass index is 29.09 kg/(m^2) as calculated from the following:    Height as of 4/19/17: 5' 7.05\" (1.703 m).    Weight as of 4/19/17: 186 lb (84.4 kg).   Weight management plan: Discussed healthy diet and exercise guidelines and patient will follow up in 12 months in clinic to re-evaluate.    Counseling Resources:  ATP IV Guidelines  Pooled Cohorts Equation Calculator  Breast Cancer Risk Calculator  FRAX Risk Assessment  ICSI Preventive Guidelines  Dietary Guidelines for Americans, 2010  USDA's MyPlate  ASA Prophylaxis  Lung CA Screening    JANNETTE LOPEZ MD  [facility]  "

## 2017-09-27 ASSESSMENT — ASTHMA QUESTIONNAIRES: ACT_TOTALSCORE: 22

## 2017-09-27 NOTE — TELEPHONE ENCOUNTER
Call her. She needs to start it on the Sunday after her next menses starts. If her menses starts on a Sunday, then she starts the pill on that Sunday. If her menses starts on a Monday, she will wait till the following Sunday to start the pill.     JANNETTE LOPEZ M.D.

## 2017-09-28 LAB
COPATH REPORT: NORMAL
PAP: NORMAL

## 2017-09-29 LAB
FINAL DIAGNOSIS: NORMAL
HPV HR 12 DNA CVX QL NAA+PROBE: NEGATIVE
HPV16 DNA SPEC QL NAA+PROBE: NEGATIVE
HPV18 DNA SPEC QL NAA+PROBE: NEGATIVE
SPECIMEN DESCRIPTION: NORMAL

## 2018-05-09 DIAGNOSIS — J45.20 MILD INTERMITTENT ASTHMA WITHOUT COMPLICATION: ICD-10-CM

## 2018-05-09 NOTE — LETTER
May 15, 2018      Ayleen Ramos  [address]   [place] [address]      Dear Ayleen,     Your clinic record indicates that you are due for an asthma update. We have a survey tool called an ACT (or Asthma Control Test) we use to measure the level of control of your asthma. Please complete the enclosed questionnaire and mail it back to us in the self-addressed stamped envelope.     If you have questions about this letter please contact your provider.     Sincerely,    Your [facility]

## 2018-05-10 RX ORDER — ALBUTEROL SULFATE 90 UG/1
AEROSOL, METERED RESPIRATORY (INHALATION)
Qty: 8.5 G | Refills: 0 | Status: SHIPPED | OUTPATIENT
Start: 2018-05-10 | End: 2018-11-13

## 2018-05-10 NOTE — TELEPHONE ENCOUNTER
Mail ACT  Prescription approved per [facility].  Dolly Lozano RN

## 2018-05-22 ASSESSMENT — ASTHMA QUESTIONNAIRES: ACT_TOTALSCORE: 23

## 2018-10-27 DIAGNOSIS — Z30.41 SURVEILLANCE OF PREVIOUSLY PRESCRIBED CONTRACEPTIVE PILL: ICD-10-CM

## 2018-10-29 RX ORDER — NORGESTIMATE AND ETHINYL ESTRADIOL 7DAYSX3 LO
1 KIT ORAL DAILY
Qty: 28 TABLET | Refills: 0 | Status: SHIPPED | OUTPATIENT
Start: 2018-10-29 | End: 2018-11-13

## 2018-11-12 NOTE — PROGRESS NOTES
SUBJECTIVE:   Ayleen Ramos is a 38 year old female who presents to clinic today for the following health issues:      Depression Followup    Status since last visit: Stable     See PHQ-9 for current symptoms.  Other associated symptoms: None    Complicating factors:   Significant life event:  No   Current substance abuse:  None  Anxiety or Panic symptoms:  No    PHQ 4/19/2017   PHQ-9 Total Score 1   Q9: Suicide Ideation Not at all        PHQ-9  English  PHQ-9   Any Language  Suicide Assessment Five-step Evaluation and Treatment (SAFE-T)  Asthma Follow-Up    Was ACT completed today?    Yes    ACT Total Scores 11/13/2018   ACT TOTAL SCORE (Goal Greater than or Equal to 20) 21   In the past 12 months, how many times did you visit the emergency room for your asthma without being admitted to the hospital? 0   In the past 12 months, how many times were you hospitalized overnight because of your asthma? 0       Recent asthma triggers that patient is dealing with: cold air        Amount of exercise or physical activity: 1-2 days/week for an average of 40 minutes    Problems taking medications regularly: No    Medication side effects: none    Diet: regular (no restrictions)             Problem list and histories reviewed & adjusted, as indicated.  Additional history: as documented    Patient Active Problem List   Diagnosis     Mild intermittent asthma without complication     Anxiety     History reviewed. No pertinent surgical history.    Social History   Substance Use Topics     Smoking status: Former Smoker     Quit date: 1/1/2000     Smokeless tobacco: Never Used     Alcohol use Yes     Family History   Problem Relation Age of Onset     Heart Defect Father      Enlarge Heart     Bladder Cancer Maternal Grandmother      Influenza/Pneumonia Maternal Grandfather      Colon Cancer Paternal Grandmother      Celiac Disease Sister      Thyroid Disease Sister          Current Outpatient Prescriptions   Medication Sig  Dispense Refill     mometasone (ELOCON) 0.1 % ointment Apply sparingly to affected area twice daily for 14 days.  Do not apply to face. 15 g 3     norgestim-eth estrad triphasic (TRINESSA LO) 0.18/0.215/0.25 MG-25 MCG per tablet Take 1 tablet by mouth daily 84 tablet 5     PROAIR  (90 Base) MCG/ACT inhaler INHALE 1 TO 2 PUFFS BY MOUTH INTO LUNGS EVERY 6 HOURS AS NEEDED 8.5 g 11     sertraline (ZOLOFT) 50 MG tablet Take 2 daily 180 tablet 3     [DISCONTINUED] norgestim-eth estrad triphasic (TRINESSA LO) 0.18/0.215/0.25 MG-25 MCG per tablet Take 1 tablet by mouth daily Need to see MD for further refills 28 tablet 0     [DISCONTINUED] PROAIR  (90 Base) MCG/ACT inhaler INHALE 1 TO 2 PUFFS BY MOUTH INTO LUNGS EVERY 6 HOURS AS NEEDED 8.5 g 0     [DISCONTINUED] sertraline (ZOLOFT) 50 MG tablet Take 2 daily 180 tablet 3     Allergies   Allergen Reactions     Sulfa Drugs Hives     Wellbutrin [Bupropion] Other (See Comments)     Severe constipation      BP Readings from Last 3 Encounters:   11/13/18 116/62   09/26/17 124/70   04/19/17 120/60    Wt Readings from Last 3 Encounters:   11/13/18 199 lb (90.3 kg)   09/26/17 181 lb 8 oz (82.3 kg)   04/19/17 186 lb (84.4 kg)                  Labs reviewed in EPIC    Reviewed and updated as needed this visit by clinical staff  Tobacco  Allergies  Meds  Med Hx  Surg Hx  Fam Hx  Soc Hx      Reviewed and updated as needed this visit by Provider         ROS:  This 38 year old female is here today to get her meds refilled. She is on the computer all day and washes her hands and uses hand  a lot. It has flared her eczema. Would like a steroid cream to apply at night. Her asthma is under good control. No new concerns. She helps her parents, who both retired early due to health reasons. She enjoys helping. All other review of systems are negative  Personal, family, and social history reviewed with patient and revised.         OBJECTIVE:     /62  Pulse 72   "Temp 97  F (36.1  C) (Oral)  Resp 16  Ht 5' 6.77\" (1.696 m)  Wt 199 lb (90.3 kg)  SpO2 98%  BMI 31.38 kg/m2  Body mass index is 31.38 kg/(m^2).  GENERAL: healthy, alert and no distress  NECK: no adenopathy, no asymmetry, masses, or scars and thyroid normal to palpation  RESP: lungs clear to auscultation - no rales, rhonchi or wheezes  CV: regular rate and rhythm, normal S1 S2, no S3 or S4, no murmur, click or rub, no peripheral edema and peripheral pulses strong  ABDOMEN: soft, nontender,  MS: no gross musculoskeletal defects noted, no edema  Top of right hand has red dry patches of eczema    Diagnostic Test Results:  none     ASSESSMENT/PLAN:              1. Mild intermittent asthma without complication  ACT = 21  Good control   - PROAIR  (90 Base) MCG/ACT inhaler; INHALE 1 TO 2 PUFFS BY MOUTH INTO LUNGS EVERY 6 HOURS AS NEEDED  Dispense: 8.5 g; Refill: 11    2. Surveillance of previously prescribed contraceptive pill  Good control   - norgestim-eth estrad triphasic (TRINESSA LO) 0.18/0.215/0.25 MG-25 MCG per tablet; Take 1 tablet by mouth daily  Dispense: 84 tablet; Refill: 5    3. Anxiety  Good control   NIDA = 0  - sertraline (ZOLOFT) 50 MG tablet; Take 2 daily  Dispense: 180 tablet; Refill: 3    4. Eczema, unspecified type  As above   - mometasone (ELOCON) 0.1 % ointment; Apply sparingly to affected area twice daily for 14 days.  Do not apply to face.  Dispense: 15 g; Refill: 3    Return to clinic 1 year     JANNETTE LOPEZ MD  [facility].  "

## 2018-11-13 ENCOUNTER — OFFICE VISIT (OUTPATIENT)
Dept: FAMILY MEDICINE | Facility: CLINIC | Age: 38
End: 2018-11-13
Payer: COMMERCIAL

## 2018-11-13 VITALS
RESPIRATION RATE: 16 BRPM | DIASTOLIC BLOOD PRESSURE: 62 MMHG | OXYGEN SATURATION: 98 % | WEIGHT: 199 LBS | TEMPERATURE: 97 F | SYSTOLIC BLOOD PRESSURE: 116 MMHG | BODY MASS INDEX: 31.23 KG/M2 | HEART RATE: 72 BPM | HEIGHT: 67 IN

## 2018-11-13 DIAGNOSIS — Z30.41 SURVEILLANCE OF PREVIOUSLY PRESCRIBED CONTRACEPTIVE PILL: ICD-10-CM

## 2018-11-13 DIAGNOSIS — J45.20 MILD INTERMITTENT ASTHMA WITHOUT COMPLICATION: Primary | ICD-10-CM

## 2018-11-13 DIAGNOSIS — L30.9 ECZEMA, UNSPECIFIED TYPE: ICD-10-CM

## 2018-11-13 DIAGNOSIS — F41.9 ANXIETY: ICD-10-CM

## 2018-11-13 PROCEDURE — 99213 OFFICE O/P EST LOW 20 MIN: CPT | Performed by: FAMILY MEDICINE

## 2018-11-13 RX ORDER — MOMETASONE FUROATE 1 MG/G
OINTMENT TOPICAL
Qty: 15 G | Refills: 3 | Status: SHIPPED | OUTPATIENT
Start: 2018-11-13 | End: 2021-01-07

## 2018-11-13 RX ORDER — ALBUTEROL SULFATE 90 UG/1
AEROSOL, METERED RESPIRATORY (INHALATION)
Qty: 8.5 G | Refills: 11 | Status: SHIPPED | OUTPATIENT
Start: 2018-11-13 | End: 2020-01-29

## 2018-11-13 RX ORDER — NORGESTIMATE AND ETHINYL ESTRADIOL 7DAYSX3 LO
1 KIT ORAL DAILY
Qty: 84 TABLET | Refills: 5 | Status: SHIPPED | OUTPATIENT
Start: 2018-11-13 | End: 2020-01-29

## 2018-11-13 ASSESSMENT — ANXIETY QUESTIONNAIRES
1. FEELING NERVOUS, ANXIOUS, OR ON EDGE: NOT AT ALL
7. FEELING AFRAID AS IF SOMETHING AWFUL MIGHT HAPPEN: NOT AT ALL
5. BEING SO RESTLESS THAT IT IS HARD TO SIT STILL: NOT AT ALL
IF YOU CHECKED OFF ANY PROBLEMS ON THIS QUESTIONNAIRE, HOW DIFFICULT HAVE THESE PROBLEMS MADE IT FOR YOU TO DO YOUR WORK, TAKE CARE OF THINGS AT HOME, OR GET ALONG WITH OTHER PEOPLE: NOT DIFFICULT AT ALL
2. NOT BEING ABLE TO STOP OR CONTROL WORRYING: NOT AT ALL
6. BECOMING EASILY ANNOYED OR IRRITABLE: NOT AT ALL
GAD7 TOTAL SCORE: 0
3. WORRYING TOO MUCH ABOUT DIFFERENT THINGS: NOT AT ALL

## 2018-11-13 ASSESSMENT — PATIENT HEALTH QUESTIONNAIRE - PHQ9
5. POOR APPETITE OR OVEREATING: NOT AT ALL
SUM OF ALL RESPONSES TO PHQ QUESTIONS 1-9: 1

## 2018-11-13 NOTE — MR AVS SNAPSHOT
After Visit Summary   11/13/2018    Ayleen Ramos    MRN: [id number]           Patient Information     Date Of Birth          1980        Visit Information        Provider Department      11/13/2018 2:00 PM Denise Pacheco MD [facility]        Today's Diagnoses     Mild intermittent asthma without complication    -  1    Surveillance of previously prescribed contraceptive pill        Anxiety        Eczema, unspecified type          Care Instructions    [facility]    If you have any questions regarding to your visit please contact your care team:       Team Purple:   Clinic Hours Telephone Number   Dr. Denise Nobles   7am-7pm  Monday - Thursday   7am-5pm  Fridays  [phone]  (Appointment scheduling available 24/7)   Urgent Care - [place] and [facility] - 11am-9pm Monday-Friday Saturday-Sunday- 9am-5pm   [place] - 5pm-9pm Monday-Friday Saturday-Sunday- 9am-5pm  [phone] - [place]  [phone] - [place]       What options do I have for a visit other than an office visit? We offer electronic visits (e-visits) and telephone visits, when medically appropriate.  Please check with your medical insurance to see if these types of visits are covered, as you will be responsible for any charges that are not paid by your insurance.      You can use [employer] (secure electronic communication) to access to your chart, send your primary care provider a message, or make an appointment. Ask a team member how to get started.     For a price quote for your services, please call our Consumer Price Line at [phone] or our Imaging Cost estimation line at [phone] (for imaging tests).              Follow-ups after your visit        Who to contact     If you have questions or need follow up information about today's clinic visit or your schedule please contact [facility] directly at  "[phone].  Normal or non-critical lab and imaging results will be communicated to you by MyChart, letter or phone within 4 business days after the clinic has received the results. If you do not hear from us within 7 days, please contact the clinic through [employer] or phone. If you have a critical or abnormal lab result, we will notify you by phone as soon as possible.  Submit refill requests through [employer] or call your pharmacy and they will forward the refill request to us. Please allow 3 business days for your refill to be completed.          Additional Information About Your Visit        [employer] Information     [employer] gives you secure access to your electronic health record. If you see a primary care provider, you can also send messages to your care team and make appointments. If you have questions, please call your primary care clinic.  If you do not have a primary care provider, please call [phone] and they will assist you.        Care EveryWhere ID     This is your Care EveryWhere ID. This could be used by other organizations to access your [facility] medical records  [id number]        Your Vitals Were     Pulse Temperature Respirations Height Pulse Oximetry BMI (Body Mass Index)    72 97  F (36.1  C) (Oral) 16 5' 6.77\" (1.696 m) 98% 31.38 kg/m2       Blood Pressure from Last 3 Encounters:   11/13/18 116/62   09/26/17 124/70   04/19/17 120/60    Weight from Last 3 Encounters:   11/13/18 199 lb (90.3 kg)   09/26/17 181 lb 8 oz (82.3 kg)   04/19/17 186 lb (84.4 kg)              Today, you had the following     No orders found for display         Today's Medication Changes          These changes are accurate as of 11/13/18  2:46 PM.  If you have any questions, ask your nurse or doctor.               Start taking these medicines.        Dose/Directions    mometasone 0.1 % ointment   Commonly known as:  ELOCON   Used for:  Eczema, unspecified type   Started by:  Denise Pacheco MD        Apply " sparingly to affected area twice daily for 14 days.  Do not apply to face.   Quantity:  15 g   Refills:  3         These medicines have changed or have updated prescriptions.        Dose/Directions    norgestim-eth estrad triphasic 0.18/0.215/0.25 MG-25 MCG per tablet   Commonly known as:  TRINESSA LO   This may have changed:  additional instructions   Used for:  Surveillance of previously prescribed contraceptive pill   Changed by:  Denise Pacheco MD        Dose:  1 tablet   Take 1 tablet by mouth daily   Quantity:  84 tablet   Refills:  5            Where to get your medicines      These medications were sent to [facility] [address] [address] [address] [facility] [address]  [address] [facility] [address]     Phone:  [phone]     mometasone 0.1 % ointment    norgestim-eth estrad triphasic 0.18/0.215/0.25 MG-25 MCG per tablet    PROAIR  (90 Base) MCG/ACT inhaler    sertraline 50 MG tablet                Primary Care Provider Office Phone # Fax #    Beverly Marycruz Blount, APRN -[phone]       [address] [facility] [address]        Equal Access to Services     SANDY CALVO AH: Hadii aad ku hadasho Soomaali, waaxda luqadaha, qaybta kaalmada adeegyada, waxay idiin hayaan adeapril chen. So [facility] [phone].    ATENCIÓN: Si habla español, tiene a soto disposición servicios gratuitos de asistencia lingüística. Llame al [phone].    We comply with applicable federal civil rights laws and [place] laws. We do not discriminate on the basis of race, color, national origin, age, disability, sex, sexual orientation, or gender identity.            Thank you!     Thank you for choosing [facility]  for your care. Our goal is always to provide you with excellent care. Hearing back from our patients is one way we can continue to improve our services. Please take a few minutes to complete the written survey that  you may receive in the mail after your visit with us. Thank you!             Your Updated Medication List - Protect others around you: Learn how to safely use, store and throw away your medicines at www.disposemymeds.org.          This list is accurate as of 11/13/18  2:46 PM.  Always use your most recent med list.                   Brand Name Dispense Instructions for use Diagnosis    mometasone 0.1 % ointment    ELOCON    15 g    Apply sparingly to affected area twice daily for 14 days.  Do not apply to face.    Eczema, unspecified type       norgestim-eth estrad triphasic 0.18/0.215/0.25 MG-25 MCG per tablet    TRINESSA LO    84 tablet    Take 1 tablet by mouth daily    Surveillance of previously prescribed contraceptive pill       PROAIR  (90 Base) MCG/ACT inhaler   Generic drug:  albuterol     8.5 g    INHALE 1 TO 2 PUFFS BY MOUTH INTO LUNGS EVERY 6 HOURS AS NEEDED    Mild intermittent asthma without complication       sertraline 50 MG tablet    ZOLOFT    180 tablet    Take 2 daily    Anxiety

## 2018-11-13 NOTE — PATIENT INSTRUCTIONS
[facility]    If you have any questions regarding to your visit please contact your care team:       Team Purple:   Clinic Hours Telephone Number   Dr. Denise Nobles   7am-7pm  Monday - Thursday   7am-5pm  Fridays  [phone]  (Appointment scheduling available 24/7)   Urgent Care - [place] and [facility] - 11am-9pm Monday-Friday Saturday-Sunday- 9am-5pm   [place] - 5pm-9pm Monday-Friday Saturday-Sunday- 9am-5pm  [phone] - [place]  [phone] - [place]       What options do I have for a visit other than an office visit? We offer electronic visits (e-visits) and telephone visits, when medically appropriate.  Please check with your medical insurance to see if these types of visits are covered, as you will be responsible for any charges that are not paid by your insurance.      You can use [employer] (secure electronic communication) to access to your chart, send your primary care provider a message, or make an appointment. Ask a team member how to get started.     For a price quote for your services, please call our Consumer Price Line at [phone] or our Imaging Cost estimation line at [phone] (for imaging tests).

## 2018-11-13 NOTE — LETTER
My Asthma Action Plan  Name: Ayleen Ramos   YOB: 1980  Date: 11/12/2018   My doctor: JANNETTE LOPEZ MD   My clinic: [facility]        My Control Medicine: { :857366}  My Rescue Medicine: { :752616}  {AAP include Oral Steroid:337713} My Asthma Severity: { :358643}  Avoid your asthma triggers: { :948663}  None     {Is patient a child or adult?:167181}       GREEN ZONE   Good Control    I feel good    No cough or wheeze    Can work, sleep and play without asthma symptoms       Take your asthma control medicine every day.     1. If exercise triggers your asthma, take your rescue medication    15 minutes before exercise or sports, and    During exercise if you have asthma symptoms  2. Spacer to use with inhaler: If you have a spacer, make sure to use it with your inhaler             YELLOW ZONE Getting Worse  I have ANY of these:    I do not feel good    Cough or wheeze    Chest feels tight    Wake up at night   1. Keep taking your Green Zone medications  2. Start taking your rescue medicine:    every 20 minutes for up to 1 hour. Then every 4 hours for 24-48 hours.  3. If you stay in the Yellow Zone for more than 12-24 hours, contact your doctor.  4. If you do not return to the Green Zone in 12-24 hours or you get worse, start taking your oral steroid medicine if prescribed by your provider.           RED ZONE Medical Alert - Get Help  I have ANY of these:    I feel awful    Medicine is not helping    Breathing getting harder    Trouble walking or talking    Nose opens wide to breathe       1. Take your rescue medicine NOW  2. If your provider has prescribed an oral steroid medicine, start taking it NOW  3. Call your doctor NOW  4. If you are still in the Red Zone after 20 minutes and you have not reached your doctor:    Take your rescue medicine again and    Call 911 or go to the emergency room right away    See your regular doctor within 2 weeks of an Emergency Room or Urgent Care  visit for follow-up treatment.          Annual Reminders:  Meet with Asthma Educator,  Flu Shot in the Fall, consider Pneumonia Vaccination for patients with asthma (aged 19 and older).    Pharmacy: [facility] [id number] [address] [address]                      Asthma Triggers  How To Control Things That Make Your Asthma Worse    Triggers are things that make your asthma worse.  Look at the list below to help you find your triggers and what you can do about them.  You can help prevent asthma flare-ups by staying away from your triggers.      Trigger                                                          What you can do   Cigarette Smoke  Tobacco smoke can make asthma worse. Do not allow smoking in your home, car or around you.  Be sure no one smokes at a child s day care or school.  If you smoke, ask your health care provider for ways to help you quit.  Ask family members to quit too.  Ask your health care provider for a referral to Quit Plan to help you quit smoking, or call 5-638-210-PLAN.     Colds, Flu, Bronchitis  These are common triggers of asthma. Wash your hands often.  Don t touch your eyes, nose or mouth.  Get a flu shot every year.     Dust Mites  These are tiny bugs that live in cloth or carpet. They are too small to see. Wash sheets and blankets in hot water every week.   Encase pillows and mattress in dust mite proof covers.  Avoid having carpet if you can. If you have carpet, vacuum weekly.   Use a dust mask and HEPA vacuum.   Pollen and Outdoor Mold  Some people are allergic to trees, grass, or weed pollen, or molds. Try to keep your windows closed.  Limit time out doors when pollen count is high.   Ask you health care provider about taking medicine during allergy season.     Animal Dander  Some people are allergic to skin flakes, urine or saliva from pets with fur or feathers. Keep pets with fur or feathers out of your home.    If you  can t keep the pet outdoors, then keep the pet out of your bedroom.  Keep the bedroom door closed.  Keep pets off cloth furniture and away from stuffed toys.     Mice, Rats, and Cockroaches  Some people are allergic to the waste from these pests.   Cover food and garbage.  Clean up spills and food crumbs.  Store grease in the refrigerator.   Keep food out of the bedroom.   Indoor Mold  This can be a trigger if your home has high moisture. Fix leaking faucets, pipes, or other sources of water.   Clean moldy surfaces.  Dehumidify basement if it is damp and smelly.   Smoke, Strong Odors, and Sprays  These can reduce air quality. Stay away from strong odors and sprays, such as perfume, powder, hair spray, paints, smoke incense, paint, cleaning products, candles and new carpet.   Exercise or Sports  Some people with asthma have this trigger. Be active!  Ask your doctor about taking medicine before sports or exercise to prevent symptoms.    Warm up for 5-10 minutes before and after sports or exercise.     Other Triggers of Asthma  Cold air:  Cover your nose and mouth with a scarf.  Sometimes laughing or crying can be a trigger.  Some medicines and food can trigger asthma.

## 2018-11-14 ASSESSMENT — ASTHMA QUESTIONNAIRES: ACT_TOTALSCORE: 21

## 2018-11-14 ASSESSMENT — ANXIETY QUESTIONNAIRES: GAD7 TOTAL SCORE: 0

## 2019-01-03 ENCOUNTER — OFFICE VISIT (OUTPATIENT)
Dept: FAMILY MEDICINE | Facility: CLINIC | Age: 39
End: 2019-01-03
Payer: COMMERCIAL

## 2019-01-03 VITALS
OXYGEN SATURATION: 99 % | SYSTOLIC BLOOD PRESSURE: 112 MMHG | HEART RATE: 89 BPM | BODY MASS INDEX: 31.23 KG/M2 | WEIGHT: 199 LBS | TEMPERATURE: 98.6 F | HEIGHT: 67 IN | DIASTOLIC BLOOD PRESSURE: 80 MMHG

## 2019-01-03 DIAGNOSIS — T75.3XXA TRAVEL SICKNESS, INITIAL ENCOUNTER: Primary | ICD-10-CM

## 2019-01-03 DIAGNOSIS — L30.9 ECZEMA, UNSPECIFIED TYPE: ICD-10-CM

## 2019-01-03 PROCEDURE — 99213 OFFICE O/P EST LOW 20 MIN: CPT | Performed by: NURSE PRACTITIONER

## 2019-01-03 RX ORDER — DIAZEPAM 5 MG
TABLET ORAL
Qty: 10 TABLET | Refills: 0 | Status: SHIPPED | OUTPATIENT
Start: 2019-01-03 | End: 2020-01-29

## 2019-01-03 RX ORDER — SCOLOPAMINE TRANSDERMAL SYSTEM 1 MG/1
1 PATCH, EXTENDED RELEASE TRANSDERMAL
Qty: 4 PATCH | Refills: 1 | Status: SHIPPED | OUTPATIENT
Start: 2019-01-03 | End: 2024-08-29

## 2019-01-03 ASSESSMENT — MIFFLIN-ST. JEOR: SCORE: 1621.67

## 2019-01-03 NOTE — PROGRESS NOTES
"  SUBJECTIVE:   Ayleen Ramos is a 38 year old female who presents to clinic today for the following health issues:      Chief Complaint   Patient presents with     Medication Request     for motion sickness medicine for an upcoming flight for a vacation trip     Patient has longstanding history of motion sickness and anxiety associated with air travel. Prior Primary Care Provider prescribed scopolamine patch and 5 mg Valium to take for flight which works well. Has upcoming travel to [place] and would like medications filled for the flight.    Was seen 6 weeks ago for eczema of hand and used steroid cream for a few days- swelling improved but area now cracked, dry, and painful.    Problem list and histories reviewed & adjusted, as indicated.  Additional history: as documented    Patient Active Problem List   Diagnosis     Mild intermittent asthma without complication     Anxiety     No past surgical history on file.    Social History     Tobacco Use     Smoking status: Former Smoker     Last attempt to quit: 2000     Years since quittin.0     Smokeless tobacco: Never Used   Substance Use Topics     Alcohol use: Yes     Family History   Problem Relation Age of Onset     Heart Defect Father         Enlarge Heart     Bladder Cancer Maternal Grandmother      Influenza/Pneumonia Maternal Grandfather      Colon Cancer Paternal Grandmother      Celiac Disease Sister      Thyroid Disease Sister            Reviewed and updated as needed this visit by clinical staff       Reviewed and updated as needed this visit by Provider         ROS:  Constitutional, HEENT, cardiovascular, pulmonary, gi and gu, skin systems are negative, except as otherwise noted.    OBJECTIVE:     /80 (BP Location: Left arm, Patient Position: Chair, Cuff Size: Adult Regular)   Pulse 89   Temp 98.6  F (37  C) (Oral)   Ht 1.696 m (5' 6.77\")   Wt 90.3 kg (199 lb)   SpO2 99%   BMI 31.38 kg/m    Body mass index is 31.38 " kg/m .  GENERAL: healthy, alert and no distress  SKIN: erythema, scaling, fissuring over right dorsal hand 4th-5th MCPs  PSYCH: mentation appears normal, affect normal/bright    Diagnostic Test Results:  none     ASSESSMENT/PLAN:     1. Travel sickness, initial encounter  Medications refilled for travel.  - scopolamine (TRANSDERM) 1 MG/3DAYS 72 hr patch; Place 1 patch onto the skin every 72 hours for 3 days  Dispense: 4 patch; Refill: 1  - diazepam (VALIUM) 5 MG tablet; Take 5 mg orally 30 minutes prior to flight  Dispense: 10 tablet; Refill: 0    2. Eczema, unspecified type  See patient instructions.      Patient Instructions   Use the Cetaphil after every time you wash or put on hand . Keep using the gloves.  Use the steroid cream twice daily and continue for 1-2 days after the rash clears.      DARIN Duran [facility]

## 2019-01-03 NOTE — PATIENT INSTRUCTIONS
Use the Cetaphil after every time you wash or put on hand . Keep using the gloves.  Use the steroid cream twice daily and continue for 1-2 days after the rash clears.

## 2019-05-22 ENCOUNTER — TELEPHONE (OUTPATIENT)
Dept: FAMILY MEDICINE | Facility: CLINIC | Age: 39
End: 2019-05-22

## 2019-05-22 DIAGNOSIS — J45.20 MILD INTERMITTENT ASTHMA WITHOUT COMPLICATION: Primary | ICD-10-CM

## 2019-05-22 RX ORDER — ALBUTEROL SULFATE 90 UG/1
1-2 AEROSOL, METERED RESPIRATORY (INHALATION) EVERY 6 HOURS PRN
Qty: 8.5 G | Refills: 3 | Status: SHIPPED | OUTPATIENT
Start: 2019-05-22 | End: 2020-01-30

## 2019-05-22 NOTE — TELEPHONE ENCOUNTER
Routing refill request to provider for review/approval because:  Last prescribed by Dr. Pacheco, pharmacy requesting generic Rx be sent.   Last OV 1/3/2019    Requested Prescriptions   Pending Prescriptions Disp Refills     albuterol (PROAIR HFA/PROVENTIL HFA/VENTOLIN HFA) 108 (90 Base) MCG/ACT inhaler 8.5 g 1     Sig: Inhale 1-2 puffs into the lungs every 6 hours as needed for shortness of breath / dyspnea or wheezing       There is no refill protocol information for this order        Karely Avila RN

## 2019-05-22 NOTE — TELEPHONE ENCOUNTER
PROAIR  (90 Base) MCG/ACT inhaler is available in a cost saving generic alternative. We are requesting authorization to rewrite this prescription as the generic.

## 2019-11-26 ENCOUNTER — TELEPHONE (OUTPATIENT)
Dept: FAMILY MEDICINE | Facility: CLINIC | Age: 39
End: 2019-11-26

## 2019-11-26 NOTE — TELEPHONE ENCOUNTER
Patient Quality Outreach      Patient has the following on her problem list:       Asthma review       ACT Total Scores 11/13/2018   ACT TOTAL SCORE (Goal Greater than or Equal to 20) 21   In the past 12 months, how many times did you visit the emergency room for your asthma without being admitted to the hospital? 0   In the past 12 months, how many times were you hospitalized overnight because of your asthma? 0          Composite cancer screening  Chart review shows that this patient is due/due soon for the following None  Patient declined cancer screening. No  Summary:    Patient is due/failing the following:   Asthma - ACT Needed    Type of outreach:    Copy of ACT mailed to patient, will reach out in 5 days.    Questions for provider review:    None                                                                                                                                    Danay Shaw MA       Chart routed to Care Team.

## 2019-11-26 NOTE — LETTER
November 26, 2019      Ayleen Ramos  [address]   [place] [address]      Dear Ayleen,     Your clinic record indicates that you are due for an asthma update. We have a survey tool called an ACT (or Asthma Control Test) we use to measure the level of control of your asthma. Please complete the enclosed questionnaire and mail it back to us in the self-addressed stamped envelope.     If you have questions about this letter please contact your provider.     Sincerely,      Your [facility]

## 2019-12-10 ASSESSMENT — ASTHMA QUESTIONNAIRES: ACT_TOTALSCORE: 22

## 2020-01-13 ENCOUNTER — TELEPHONE (OUTPATIENT)
Dept: FAMILY MEDICINE | Facility: CLINIC | Age: 40
End: 2020-01-13

## 2020-01-13 DIAGNOSIS — T75.3XXA TRAVEL SICKNESS, INITIAL ENCOUNTER: ICD-10-CM

## 2020-01-13 NOTE — TELEPHONE ENCOUNTER
diazepam (VALIUM) 5 MG tablet      Last Written Prescription Date:  1-3-2019  Last Fill Quantity: 10,   # refills: 0  Last Office Visit: 1-3-2019  Future Office visit:       Routing refill request to provider for review/approval because:  Drug not on the [facility], [facility] or [facility] refill protocol or controlled substance

## 2020-01-15 RX ORDER — DIAZEPAM 5 MG
TABLET ORAL
Qty: 10 TABLET | Refills: 0 | OUTPATIENT
Start: 2020-01-15

## 2020-01-15 NOTE — TELEPHONE ENCOUNTER
Routing refill request to provider for review/approval because:  Patient needs to be seen because it has been more than 1 year since last office visit.    Requested Prescriptions   Pending Prescriptions Disp Refills     diazepam (VALIUM) 5 MG tablet [Pharmacy Med Name: DIAZEPAM 5MG TABS] 10 tablet 0     Sig: TAKE 1 TABLET BY MOUTH 30 MIN PRIOR TO FLIGHT       There is no refill protocol information for this order        Karely Bermudez RN

## 2020-01-15 NOTE — TELEPHONE ENCOUNTER
Please notify patient she needs an appointment, either in person or E-visit.     Karely Bermudez RN

## 2020-01-16 NOTE — TELEPHONE ENCOUNTER
Spoke to patient and she stated she can wait until appointment as she does not need these until February.  Melina STOVALL CMA ([facility])

## 2020-01-16 NOTE — TELEPHONE ENCOUNTER
Med not active on med list  Has been over 1 year since she has been seen  Has appointment with Beverly Blount CNP on 1/23/2020   Can she wait until then to request refill on her scopolamine (TRANSDERM) patches for travel/motion sickness?    Byron Bone RN

## 2020-01-17 NOTE — TELEPHONE ENCOUNTER
Patient has appointment 1/23/2020 with provider.  Melina STOVALL CMA ([facility])

## 2020-01-27 DIAGNOSIS — J45.20 MILD INTERMITTENT ASTHMA WITHOUT COMPLICATION: ICD-10-CM

## 2020-01-27 DIAGNOSIS — Z30.41 SURVEILLANCE OF PREVIOUSLY PRESCRIBED CONTRACEPTIVE PILL: ICD-10-CM

## 2020-01-27 RX ORDER — ALBUTEROL SULFATE 90 UG/1
1-2 AEROSOL, METERED RESPIRATORY (INHALATION) EVERY 6 HOURS PRN
Qty: 8.5 G | Refills: 3 | Status: CANCELLED | OUTPATIENT
Start: 2020-01-27

## 2020-01-27 RX ORDER — NORGESTIMATE AND ETHINYL ESTRADIOL 7DAYSX3 LO
1 KIT ORAL DAILY
Qty: 84 TABLET | Refills: 5 | Status: CANCELLED | OUTPATIENT
Start: 2020-01-27

## 2020-01-29 ENCOUNTER — OFFICE VISIT (OUTPATIENT)
Dept: FAMILY MEDICINE | Facility: CLINIC | Age: 40
End: 2020-01-29
Payer: COMMERCIAL

## 2020-01-29 VITALS
BODY MASS INDEX: 30.29 KG/M2 | DIASTOLIC BLOOD PRESSURE: 82 MMHG | WEIGHT: 193 LBS | TEMPERATURE: 96.5 F | HEART RATE: 72 BPM | SYSTOLIC BLOOD PRESSURE: 128 MMHG | HEIGHT: 67 IN | OXYGEN SATURATION: 99 %

## 2020-01-29 DIAGNOSIS — Z30.41 SURVEILLANCE OF PREVIOUSLY PRESCRIBED CONTRACEPTIVE PILL: ICD-10-CM

## 2020-01-29 DIAGNOSIS — F32.0 MILD MAJOR DEPRESSION (H): ICD-10-CM

## 2020-01-29 DIAGNOSIS — N39.46 MIXED INCONTINENCE URGE AND STRESS (MALE)(FEMALE): ICD-10-CM

## 2020-01-29 DIAGNOSIS — F41.9 ANXIETY: ICD-10-CM

## 2020-01-29 DIAGNOSIS — Z87.898 HISTORY OF MOTION SICKNESS: ICD-10-CM

## 2020-01-29 DIAGNOSIS — E55.9 VITAMIN D DEFICIENCY: ICD-10-CM

## 2020-01-29 DIAGNOSIS — J45.20 MILD INTERMITTENT ASTHMA WITHOUT COMPLICATION: Primary | ICD-10-CM

## 2020-01-29 LAB
ALBUMIN UR-MCNC: NEGATIVE MG/DL
APPEARANCE UR: CLEAR
BILIRUB UR QL STRIP: NEGATIVE
COLOR UR AUTO: YELLOW
GLUCOSE UR STRIP-MCNC: NEGATIVE MG/DL
HGB UR QL STRIP: NEGATIVE
KETONES UR STRIP-MCNC: NEGATIVE MG/DL
LEUKOCYTE ESTERASE UR QL STRIP: NEGATIVE
NITRATE UR QL: NEGATIVE
PH UR STRIP: 7 PH (ref 5–7)
SOURCE: NORMAL
SP GR UR STRIP: 1.02 (ref 1–1.03)
UROBILINOGEN UR STRIP-ACNC: 0.2 EU/DL (ref 0.2–1)

## 2020-01-29 PROCEDURE — 81003 URINALYSIS AUTO W/O SCOPE: CPT | Performed by: NURSE PRACTITIONER

## 2020-01-29 PROCEDURE — 36415 COLL VENOUS BLD VENIPUNCTURE: CPT | Performed by: NURSE PRACTITIONER

## 2020-01-29 PROCEDURE — 82306 VITAMIN D 25 HYDROXY: CPT | Performed by: NURSE PRACTITIONER

## 2020-01-29 PROCEDURE — 99214 OFFICE O/P EST MOD 30 MIN: CPT | Performed by: NURSE PRACTITIONER

## 2020-01-29 RX ORDER — ALBUTEROL SULFATE 90 UG/1
AEROSOL, METERED RESPIRATORY (INHALATION)
Qty: 8.5 G | Refills: 11 | Status: SHIPPED | OUTPATIENT
Start: 2020-01-29 | End: 2021-01-07

## 2020-01-29 RX ORDER — NORGESTIMATE AND ETHINYL ESTRADIOL 7DAYSX3 LO
1 KIT ORAL DAILY
Qty: 84 TABLET | Refills: 5 | Status: SHIPPED | OUTPATIENT
Start: 2020-01-29 | End: 2021-01-07

## 2020-01-29 RX ORDER — SERTRALINE HYDROCHLORIDE 100 MG/1
TABLET, FILM COATED ORAL
Qty: 90 TABLET | Refills: 1 | Status: SHIPPED | OUTPATIENT
Start: 2020-01-29 | End: 2020-12-21

## 2020-01-29 RX ORDER — DIAZEPAM 5 MG
TABLET ORAL
Qty: 10 TABLET | Refills: 0 | Status: SHIPPED | OUTPATIENT
Start: 2020-01-29 | End: 2021-01-07

## 2020-01-29 RX ORDER — SCOLOPAMINE TRANSDERMAL SYSTEM 1 MG/1
1 PATCH, EXTENDED RELEASE TRANSDERMAL
Qty: 2 PATCH | Refills: 0 | Status: SHIPPED | OUTPATIENT
Start: 2020-01-29 | End: 2021-01-07

## 2020-01-29 RX ORDER — ALBUTEROL SULFATE 90 UG/1
1-2 AEROSOL, METERED RESPIRATORY (INHALATION) EVERY 6 HOURS PRN
Qty: 8.5 G | Refills: 3 | Status: CANCELLED | OUTPATIENT
Start: 2020-01-29

## 2020-01-29 RX ORDER — CHOLECALCIFEROL (VITAMIN D3) 50 MCG
1 TABLET ORAL DAILY
COMMUNITY

## 2020-01-29 ASSESSMENT — ANXIETY QUESTIONNAIRES
7. FEELING AFRAID AS IF SOMETHING AWFUL MIGHT HAPPEN: NOT AT ALL
2. NOT BEING ABLE TO STOP OR CONTROL WORRYING: NOT AT ALL
GAD7 TOTAL SCORE: 0
6. BECOMING EASILY ANNOYED OR IRRITABLE: NOT AT ALL
IF YOU CHECKED OFF ANY PROBLEMS ON THIS QUESTIONNAIRE, HOW DIFFICULT HAVE THESE PROBLEMS MADE IT FOR YOU TO DO YOUR WORK, TAKE CARE OF THINGS AT HOME, OR GET ALONG WITH OTHER PEOPLE: NOT DIFFICULT AT ALL
3. WORRYING TOO MUCH ABOUT DIFFERENT THINGS: NOT AT ALL
1. FEELING NERVOUS, ANXIOUS, OR ON EDGE: NOT AT ALL
5. BEING SO RESTLESS THAT IT IS HARD TO SIT STILL: NOT AT ALL

## 2020-01-29 ASSESSMENT — PATIENT HEALTH QUESTIONNAIRE - PHQ9: 5. POOR APPETITE OR OVEREATING: NOT AT ALL

## 2020-01-29 ASSESSMENT — MIFFLIN-ST. JEOR: SCORE: 1593.07

## 2020-01-30 LAB — DEPRECATED CALCIDIOL+CALCIFEROL SERPL-MC: 55 UG/L (ref 20–75)

## 2020-01-30 ASSESSMENT — ASTHMA QUESTIONNAIRES: ACT_TOTALSCORE: 22

## 2020-01-30 ASSESSMENT — ANXIETY QUESTIONNAIRES: GAD7 TOTAL SCORE: 0

## 2020-03-10 ENCOUNTER — HEALTH MAINTENANCE LETTER (OUTPATIENT)
Age: 40
End: 2020-03-10

## 2020-04-08 ENCOUNTER — TELEPHONE (OUTPATIENT)
Dept: PHYSICAL THERAPY | Facility: CLINIC | Age: 40
End: 2020-04-08

## 2020-04-08 NOTE — TELEPHONE ENCOUNTER
LVM for Ayleen to call ([facility] # given) regarding pelvic floor eval on 4-15-20. Would like to change to video(preferred) or phone appt.

## 2020-06-08 ENCOUNTER — TELEPHONE (OUTPATIENT)
Dept: PHYSICAL THERAPY | Facility: CLINIC | Age: 40
End: 2020-06-08

## 2020-12-10 ENCOUNTER — TELEPHONE (OUTPATIENT)
Dept: FAMILY MEDICINE | Facility: CLINIC | Age: 40
End: 2020-12-10

## 2020-12-10 DIAGNOSIS — F41.9 ANXIETY: ICD-10-CM

## 2020-12-10 DIAGNOSIS — F32.0 MILD MAJOR DEPRESSION (H): ICD-10-CM

## 2020-12-16 NOTE — TELEPHONE ENCOUNTER
Called and left a message for patient to call the clinic back. Pt needs appointment for follow up on depression and anxiety with Beverly Blount.    Jessica Canales MA

## 2020-12-16 NOTE — TELEPHONE ENCOUNTER
Spoke to patient let her know that she need appointment with PCP for more refill on medication. Schedule patient for appointment on 01/7/21 for a video visit    Yandel Urban. MA

## 2020-12-18 NOTE — TELEPHONE ENCOUNTER
Looks like she would have run out of zoloft this past summer?         90 tabs with one refill was sent 1/29/20?      Attempted to call patient at home/mobile number, no answer, left message on voicemail; patient was instructed to return call to [facility] at [phone].    Is she still on this med?   How is she taking it?    Aysha Celestin RN  [facility]

## 2020-12-21 RX ORDER — SERTRALINE HYDROCHLORIDE 100 MG/1
TABLET, FILM COATED ORAL
Qty: 30 TABLET | Refills: 0 | Status: SHIPPED | OUTPATIENT
Start: 2020-12-21 | End: 2021-01-07

## 2020-12-23 NOTE — PROGRESS NOTES
"Ayleen Ramos is a 40 year old female who is being evaluated via a billable video visit.      The patient has been notified of following:     \"This video visit will be conducted via a call between you and your physician/provider. We have found that certain health care needs can be provided without the need for an in-person physical exam.  This service lets us provide the care you need with a video conversation.  If a prescription is necessary we can send it directly to your pharmacy.  If lab work is needed we can place an order for that and you can then stop by our lab to have the test done at a later time.    Video visits are billed at different rates depending on your insurance coverage.  Please reach out to your insurance provider with any questions.    If during the course of the call the physician/provider feels a video visit is not appropriate, you will not be charged for this service.\"    Patient has given verbal consent for Video visit? Yes  How would you like to obtain your AVS? MyChart  If you are dropped from the video visit, the video invite should be resent to: Send to e-mail at: [email or web address]  Will anyone else be joining your video visit? No       Assessment & Plan     Anxiety  Well controlled, continue current medications without change  - sertraline (ZOLOFT) 100 MG tablet; Take 1 tabet daily    Mild major depression (H)  PHQ-9 score higher today but this is due to disturbed sleep, likely from increased caffeine intake. Recommend patient cut back to 2 cups coffee/day max, may also try Melatonin over the counter.   - sertraline (ZOLOFT) 100 MG tablet; Take 1 tabet daily    Surveillance of previously prescribed contraceptive pill  Continue without change  - norgestim-eth estrad triphasic (TRINESSA LO) 0.18/0.215/0.25 MG-25 MCG tablet; Take 1 tablet by mouth daily    Mild intermittent asthma without complication  Well controlled  - PROAIR  (90 Base) MCG/ACT inhaler; INHALE 1 TO 2 PUFFS " "BY MOUTH INTO LUNGS EVERY 6 HOURS AS NEEDED    History of motion sickness  Ok for refill for potential travel  - diazepam (VALIUM) 5 MG tablet; Take 5 mg orally 30 minutes prior to flight  - scopolamine (TRANSDERM) 1 MG/3DAYS 72 hr patch; Place 1 patch onto the skin every 72 hours                       BMI:   Estimated body mass index is 30.23 kg/m  as calculated from the following:    Height as of 20: 1.702 m (5' 7\").    Weight as of 20: 87.5 kg (193 lb).         See Patient Instructions    Return in about 1 year (around 2022) for Physical.    Beverly Blount, DARIN CNP  [facility]     Ayleen Ramos is a 40 year old female who presents today via video visit for the following health issues:    HPI     Depression and Anxiety Follow-Up      How are you doing with your depression since your last visit? Doing good    How are you doing with your anxiety since your last visit?  Anxiety is high, has a lot of stress but is managing     Are you having other symptoms that might be associated with depression or anxiety? No    Have you had a significant life event? Just COVID     Do you have any concerns with your use of alcohol or other drugs? No    Social History     Tobacco Use     Smoking status: Former Smoker     Quit date: 2000     Years since quittin.0     Smokeless tobacco: Never Used   Substance Use Topics     Alcohol use: Yes     Drug use: No     PHQ 2018   PHQ-9 Total Score 1 1 9   Q9: Thoughts of better off dead/self-harm past 2 weeks Not at all Not at all Not at all     NIDA-7 SCORE 2018   Total Score 0 0 1     Last PHQ-9 2021   1.  Little interest or pleasure in doing things 0   2.  Feeling down, depressed, or hopeless 0   3.  Trouble falling or staying asleep, or sleeping too much 3   4.  Feeling tired or having little energy 3   5.  Poor appetite or overeating 0   6.  Feeling bad about yourself " 0   7.  Trouble concentrating 3   8.  Moving slowly or restless 0   Q9: Thoughts of better off dead/self-harm past 2 weeks 0   PHQ-9 Total Score 9   Difficulty at work, home, or with people Not difficult at all     NIDA-7  1/7/2021   1. Feeling nervous, anxious, or on edge 1   2. Not being able to stop or control worrying 0   3. Worrying too much about different things 0   4. Trouble relaxing 0   5. Being so restless that it is hard to sit still 0   6. Becoming easily annoyed or irritable 0   7. Feeling afraid, as if something awful might happen 0   NIDA-7 Total Score 1   If you checked any problems, how difficult have they made it for you to do your work, take care of things at home, or get along with other people? Not difficult at all       Suicide Assessment Five-step Evaluation and Treatment (SAFE-T)         Video Start Time: 8:25 AM    Not sleeping as well for the last 2 months and sleepy during the day- restless from 1:30- 6 AM.  Has recently increased coffee from 2 to 3-4 cups/day.    Asthma well controlled and would like to continue current OCP. Plans to travel later this year if pandemic improved and would like Valium and Scopolamine patch filled to have on hand.    Review of Systems   Constitutional, HEENT, cardiovascular, pulmonary, gi and gu systems are negative, except as otherwise noted.      Objective           Vitals:  No vitals were obtained today due to virtual visit.    Physical Exam     GENERAL: Healthy, alert and no distress  EYES: Eyes grossly normal to inspection.  No discharge or erythema, or obvious scleral/conjunctival abnormalities.  RESP: No audible wheeze, cough, or visible cyanosis.  No visible retractions or increased work of breathing.    SKIN: Visible skin clear. No significant rash, abnormal pigmentation or lesions.  NEURO: Cranial nerves grossly intact.  Mentation and speech appropriate for age.  PSYCH: Mentation appears normal, affect normal/bright, judgement and insight intact,  normal speech and appearance well-groomed.                Video-Visit Details    Type of service:  Video Visit    Video End Time:8:35 AM    Originating Location (pt. Location): Home    Distant Location (provider location):  [facility]     Platform used for Video Visit: Tracsis

## 2020-12-27 ENCOUNTER — HEALTH MAINTENANCE LETTER (OUTPATIENT)
Age: 40
End: 2020-12-27

## 2021-01-07 ENCOUNTER — VIRTUAL VISIT (OUTPATIENT)
Dept: FAMILY MEDICINE | Facility: CLINIC | Age: 41
End: 2021-01-07
Payer: COMMERCIAL

## 2021-01-07 DIAGNOSIS — F32.0 MILD MAJOR DEPRESSION (H): ICD-10-CM

## 2021-01-07 DIAGNOSIS — Z87.898 HISTORY OF MOTION SICKNESS: ICD-10-CM

## 2021-01-07 DIAGNOSIS — F41.9 ANXIETY: Primary | ICD-10-CM

## 2021-01-07 DIAGNOSIS — Z30.41 SURVEILLANCE OF PREVIOUSLY PRESCRIBED CONTRACEPTIVE PILL: ICD-10-CM

## 2021-01-07 DIAGNOSIS — J45.20 MILD INTERMITTENT ASTHMA WITHOUT COMPLICATION: ICD-10-CM

## 2021-01-07 PROCEDURE — 99214 OFFICE O/P EST MOD 30 MIN: CPT | Mod: 95 | Performed by: NURSE PRACTITIONER

## 2021-01-07 RX ORDER — SERTRALINE HYDROCHLORIDE 100 MG/1
TABLET, FILM COATED ORAL
Qty: 90 TABLET | Refills: 3 | Status: SHIPPED | OUTPATIENT
Start: 2021-01-07 | End: 2022-02-25

## 2021-01-07 RX ORDER — SCOLOPAMINE TRANSDERMAL SYSTEM 1 MG/1
1 PATCH, EXTENDED RELEASE TRANSDERMAL
Qty: 2 PATCH | Refills: 0 | Status: SHIPPED | OUTPATIENT
Start: 2021-01-07 | End: 2022-03-30

## 2021-01-07 RX ORDER — DIAZEPAM 5 MG
TABLET ORAL
Qty: 10 TABLET | Refills: 0 | Status: SHIPPED | OUTPATIENT
Start: 2021-01-07 | End: 2022-03-30

## 2021-01-07 RX ORDER — NORGESTIMATE AND ETHINYL ESTRADIOL 7DAYSX3 LO
1 KIT ORAL DAILY
Qty: 84 TABLET | Refills: 5 | Status: SHIPPED | OUTPATIENT
Start: 2021-01-07 | End: 2022-02-25

## 2021-01-07 RX ORDER — ALBUTEROL SULFATE 90 UG/1
AEROSOL, METERED RESPIRATORY (INHALATION)
Qty: 8.5 G | Refills: 11 | Status: SHIPPED | OUTPATIENT
Start: 2021-01-07 | End: 2022-02-25

## 2021-01-07 ASSESSMENT — ANXIETY QUESTIONNAIRES
5. BEING SO RESTLESS THAT IT IS HARD TO SIT STILL: NOT AT ALL
7. FEELING AFRAID AS IF SOMETHING AWFUL MIGHT HAPPEN: NOT AT ALL
IF YOU CHECKED OFF ANY PROBLEMS ON THIS QUESTIONNAIRE, HOW DIFFICULT HAVE THESE PROBLEMS MADE IT FOR YOU TO DO YOUR WORK, TAKE CARE OF THINGS AT HOME, OR GET ALONG WITH OTHER PEOPLE: NOT DIFFICULT AT ALL
3. WORRYING TOO MUCH ABOUT DIFFERENT THINGS: NOT AT ALL
1. FEELING NERVOUS, ANXIOUS, OR ON EDGE: SEVERAL DAYS
2. NOT BEING ABLE TO STOP OR CONTROL WORRYING: NOT AT ALL
GAD7 TOTAL SCORE: 1
6. BECOMING EASILY ANNOYED OR IRRITABLE: NOT AT ALL

## 2021-01-07 ASSESSMENT — PATIENT HEALTH QUESTIONNAIRE - PHQ9
5. POOR APPETITE OR OVEREATING: NOT AT ALL
SUM OF ALL RESPONSES TO PHQ QUESTIONS 1-9: 9

## 2021-01-07 NOTE — PROGRESS NOTES
"Ayleen Ramos is a 40 year old female who is being evaluated via a billable video visit.      How would you like to obtain your AVS? {AVS Preference:591075}  If the video visit is dropped, the invitation should be resent by: {video visit invitation:809105}  Will anyone else be joining your video visit? {:002907}  {If patient encounters technical issues they should call [phone] :475247}    Video Start Time: {video visit start/end time for provider to select:995015}      Subjective     Ayleen Ramos is a 40 year old who presents to clinic today for the following health issues {ACCOMPANIED BY STATEMENT (Optional):316126}    HPI       {SUPERLIST (Optional):684044}  {additonal problems for provider to add (Optional):048898}    Review of Systems   {ROS COMP (Optional):242166}      Objective           Vitals:  No vitals were obtained today due to virtual visit.    Physical Exam   {video visit exam brief selected:333170::\"GENERAL: Healthy, alert and no distress\",\"EYES: Eyes grossly normal to inspection.  No discharge or erythema, or obvious scleral/conjunctival abnormalities.\",\"RESP: No audible wheeze, cough, or visible cyanosis.  No visible retractions or increased work of breathing.  \",\"SKIN: Visible skin clear. No significant rash, abnormal pigmentation or lesions.\",\"NEURO: Cranial nerves grossly intact.  Mentation and speech appropriate for age.\",\"PSYCH: Mentation appears normal, affect normal/bright, judgement and insight intact, normal speech and appearance well-groomed.\"}    {Diagnostic Test Results (Optional):006870}    {AMBULATORY ATTESTATION (Optional):476431}        Video-Visit Details    Type of service:  Video Visit    Video End Time:{video visit start/end time for provider to select:543659}    Originating Location (pt. Location): {video visit patient location:445782::\"Home\"}    Distant Location (provider location):  [facility]     Platform used for Video Visit: {Virtual Visit " "Platforms:467305::\"Guerda\"}  "

## 2021-01-08 ASSESSMENT — ANXIETY QUESTIONNAIRES: GAD7 TOTAL SCORE: 1

## 2021-03-22 DIAGNOSIS — J45.20 MILD INTERMITTENT ASTHMA WITHOUT COMPLICATION: ICD-10-CM

## 2021-03-25 RX ORDER — ALBUTEROL SULFATE 90 UG/1
AEROSOL, METERED RESPIRATORY (INHALATION)
Qty: 8.5 G | Refills: 11 | OUTPATIENT
Start: 2021-03-25

## 2021-04-24 ENCOUNTER — HEALTH MAINTENANCE LETTER (OUTPATIENT)
Age: 41
End: 2021-04-24

## 2021-10-09 ENCOUNTER — HEALTH MAINTENANCE LETTER (OUTPATIENT)
Age: 41
End: 2021-10-09

## 2021-10-19 PROBLEM — F32.9 MAJOR DEPRESSION: Status: ACTIVE | Noted: 2020-01-29

## 2022-02-24 DIAGNOSIS — J45.20 MILD INTERMITTENT ASTHMA WITHOUT COMPLICATION: ICD-10-CM

## 2022-02-24 DIAGNOSIS — F32.0 MILD MAJOR DEPRESSION (H): ICD-10-CM

## 2022-02-24 DIAGNOSIS — Z30.41 SURVEILLANCE OF PREVIOUSLY PRESCRIBED CONTRACEPTIVE PILL: ICD-10-CM

## 2022-02-24 DIAGNOSIS — F41.9 ANXIETY: ICD-10-CM

## 2022-02-25 RX ORDER — SERTRALINE HYDROCHLORIDE 100 MG/1
TABLET, FILM COATED ORAL
Qty: 90 TABLET | Refills: 0 | Status: SHIPPED | OUTPATIENT
Start: 2022-02-25 | End: 2022-03-30

## 2022-02-25 RX ORDER — NORGESTIMATE AND ETHINYL ESTRADIOL 7DAYSX3 LO
1 KIT ORAL DAILY
Qty: 84 TABLET | Refills: 0 | Status: SHIPPED | OUTPATIENT
Start: 2022-02-25 | End: 2022-03-30

## 2022-02-25 RX ORDER — ALBUTEROL SULFATE 90 UG/1
AEROSOL, METERED RESPIRATORY (INHALATION)
Qty: 8.5 G | Refills: 0 | Status: SHIPPED | OUTPATIENT
Start: 2022-02-25 | End: 2022-03-30

## 2022-02-25 NOTE — TELEPHONE ENCOUNTER
Prescription approved per [facility] Refill Protocol.    Regla Reagan RN

## 2022-03-30 ENCOUNTER — VIRTUAL VISIT (OUTPATIENT)
Dept: FAMILY MEDICINE | Facility: CLINIC | Age: 42
End: 2022-03-30
Payer: COMMERCIAL

## 2022-03-30 DIAGNOSIS — F32.0 MILD MAJOR DEPRESSION (H): ICD-10-CM

## 2022-03-30 DIAGNOSIS — J30.2 SEASONAL ALLERGIC RHINITIS, UNSPECIFIED TRIGGER: ICD-10-CM

## 2022-03-30 DIAGNOSIS — Z30.41 SURVEILLANCE OF PREVIOUSLY PRESCRIBED CONTRACEPTIVE PILL: ICD-10-CM

## 2022-03-30 DIAGNOSIS — F41.9 ANXIETY: ICD-10-CM

## 2022-03-30 DIAGNOSIS — Z87.898 HISTORY OF MOTION SICKNESS: Primary | ICD-10-CM

## 2022-03-30 DIAGNOSIS — J45.20 MILD INTERMITTENT ASTHMA WITHOUT COMPLICATION: ICD-10-CM

## 2022-03-30 PROCEDURE — 99214 OFFICE O/P EST MOD 30 MIN: CPT | Mod: 95 | Performed by: NURSE PRACTITIONER

## 2022-03-30 RX ORDER — DIAZEPAM 5 MG
TABLET ORAL
Qty: 10 TABLET | Refills: 0 | Status: SHIPPED | OUTPATIENT
Start: 2022-03-30 | End: 2023-04-11

## 2022-03-30 RX ORDER — CETIRIZINE HYDROCHLORIDE 10 MG/1
10 TABLET ORAL DAILY
Qty: 90 TABLET | Refills: 3 | Status: SHIPPED | OUTPATIENT
Start: 2022-03-30

## 2022-03-30 RX ORDER — NORGESTIMATE AND ETHINYL ESTRADIOL 7DAYSX3 LO
1 KIT ORAL DAILY
Qty: 84 TABLET | Refills: 3 | Status: SHIPPED | OUTPATIENT
Start: 2022-03-30 | End: 2022-12-06

## 2022-03-30 RX ORDER — SCOLOPAMINE TRANSDERMAL SYSTEM 1 MG/1
1 PATCH, EXTENDED RELEASE TRANSDERMAL
Qty: 4 PATCH | Refills: 0 | Status: SHIPPED | OUTPATIENT
Start: 2022-03-30 | End: 2023-04-11

## 2022-03-30 RX ORDER — ALBUTEROL SULFATE 90 UG/1
AEROSOL, METERED RESPIRATORY (INHALATION)
Qty: 8.5 G | Refills: 1 | Status: SHIPPED | OUTPATIENT
Start: 2022-03-30

## 2022-03-30 RX ORDER — SERTRALINE HYDROCHLORIDE 100 MG/1
TABLET, FILM COATED ORAL
Qty: 90 TABLET | Refills: 3 | Status: SHIPPED | OUTPATIENT
Start: 2022-03-30 | End: 2022-12-06

## 2022-03-30 NOTE — LETTER
My Asthma Action Plan    Name: Ayleen Ramos   YOB: 1980  Date: 3/30/2022   My doctor: DARIN Duran CNP   My clinic: [facility]        My Rescue Medicine:   Albuterol inhaler (Proair/Ventolin/Proventil HFA)  2-4 puffs EVERY 4 HOURS as needed. Use a spacer if recommended by your provider.   My Asthma Severity:   Intermittent / Exercise Induced  Know your asthma triggers: pollens and mold  None          GREEN ZONE   Good Control    I feel good    No cough or wheeze    Can work, sleep and play without asthma symptoms       Take your asthma control medicine every day.     1. If exercise triggers your asthma, take your rescue medication    15 minutes before exercise or sports, and    During exercise if you have asthma symptoms  2. Spacer to use with inhaler: If you have a spacer, make sure to use it with your inhaler             YELLOW ZONE Getting Worse  I have ANY of these:    I do not feel good    Cough or wheeze    Chest feels tight    Wake up at night   1. Keep taking your Green Zone medications  2. Start taking your rescue medicine:    every 20 minutes for up to 1 hour. Then every 4 hours for 24-48 hours.  3. If you stay in the Yellow Zone for more than 12-24 hours, contact your doctor.  4. If you do not return to the Green Zone in 12-24 hours or you get worse, start taking your oral steroid medicine if prescribed by your provider.           RED ZONE Medical Alert - Get Help  I have ANY of these:    I feel awful    Medicine is not helping    Breathing getting harder    Trouble walking or talking    Nose opens wide to breathe       1. Take your rescue medicine NOW  2. If your provider has prescribed an oral steroid medicine, start taking it NOW  3. Call your doctor NOW  4. If you are still in the Red Zone after 20 minutes and you have not reached your doctor:    Take your rescue medicine again and    Call 911 or go to the emergency room right away    See your  regular doctor within 2 weeks of an Emergency Room or Urgent Care visit for follow-up treatment.          Annual Reminders:  Meet with Asthma Educator,  Flu Shot in the Fall, consider Pneumonia Vaccination for patients with asthma (aged 19 and older).    Pharmacy: [facility] - [address]    Electronically signed by DARIN Duran CNP   Date: 03/30/22                    Asthma Triggers  How To Control Things That Make Your Asthma Worse    Triggers are things that make your asthma worse.  Look at the list below to help you find your triggers and   what you can do about them. You can help prevent asthma flare-ups by staying away from your triggers.      Trigger                                                          What you can do   Cigarette Smoke  Tobacco smoke can make asthma worse. Do not allow smoking in your home, car or around you.  Be sure no one smokes at a child s day care or school.  If you smoke, ask your health care provider for ways to help you quit.  Ask family members to quit too.  Ask your health care provider for a referral to Quit Plan to help you quit smoking, or call 7-567-179-PLAN.     Colds, Flu, Bronchitis  These are common triggers of asthma. Wash your hands often.  Don t touch your eyes, nose or mouth.  Get a flu shot every year.     Dust Mites  These are tiny bugs that live in cloth or carpet. They are too small to see. Wash sheets and blankets in hot water every week.   Encase pillows and mattress in dust mite proof covers.  Avoid having carpet if you can. If you have carpet, vacuum weekly.   Use a dust mask and HEPA vacuum.   Pollen and Outdoor Mold  Some people are allergic to trees, grass, or weed pollen, or molds. Try to keep your windows closed.  Limit time out doors when pollen count is high.   Ask you health care provider about taking medicine during allergy season.     Animal Dander  Some people are allergic to skin flakes,  urine or saliva from pets with fur or feathers. Keep pets with fur or feathers out of your home.    If you can t keep the pet outdoors, then keep the pet out of your bedroom.  Keep the bedroom door closed.  Keep pets off cloth furniture and away from stuffed toys.     Mice, Rats, and Cockroaches  Some people are allergic to the waste from these pests.   Cover food and garbage.  Clean up spills and food crumbs.  Store grease in the refrigerator.   Keep food out of the bedroom.   Indoor Mold  This can be a trigger if your home has high moisture. Fix leaking faucets, pipes, or other sources of water.   Clean moldy surfaces.  Dehumidify basement if it is damp and smelly.   Smoke, Strong Odors, and Sprays  These can reduce air quality. Stay away from strong odors and sprays, such as perfume, powder, hair spray, paints, smoke incense, paint, cleaning products, candles and new carpet.   Exercise or Sports  Some people with asthma have this trigger. Be active!  Ask your doctor about taking medicine before sports or exercise to prevent symptoms.    Warm up for 5-10 minutes before and after sports or exercise.     Other Triggers of Asthma  Cold air:  Cover your nose and mouth with a scarf.  Sometimes laughing or crying can be a trigger.  Some medicines and food can trigger asthma.

## 2022-03-30 NOTE — LETTER
My Depression Action Plan  Name: Ayleen Ramos   Date of Birth 1980  Date: 3/30/2022    My doctor: Beverly Blount   My clinic: [facility]  [address]  [place] [address]  [phone]          GREEN    ZONE   Good Control    What it looks like:     Things are going generally well. You have normal ups and downs. You may even feel depressed from time to time, but bad moods usually last less than a day.   What you need to do:  1. Continue to care for yourself (see self care plan)  2. Check your depression survival kit and update it as needed  3. Follow your physician s recommendations including any medication.  4. Do not stop taking medication unless you consult with your physician first.           YELLOW         ZONE Getting Worse    What it looks like:     Depression is starting to interfere with your life.     It may be hard to get out of bed; you may be starting to isolate yourself from others.    Symptoms of depression are starting to last most all day and this has happened for several days.     You may have suicidal thoughts but they are not constant.   What you need to do:     1. Call your care team. Your response to treatment will improve if you keep your care team informed of your progress. Yellow periods are signs an adjustment may need to be made.     2. Continue your self-care.  Just get dressed and ready for the day.  Don't give yourself time to talk yourself out of it.    3. Talk to someone in your support network.    4. Open up your Depression Self-Care Plan/Wellness Kit.           RED    ZONE Medical Alert - Get Help    What it looks like:     Depression is seriously interfering with your life.     You may experience these or other symptoms: You can t get out of bed most days, can t work or engage in other necessary activities, you have trouble taking care of basic hygiene, or basic responsibilities, thoughts of suicide or death that will  not go away, self-injurious behavior.     What you need to do:  1. Call your care team and request a same-day appointment. If they are not available (weekends or after hours) call your local crisis line, emergency room or 911.          Depression Self-Care Plan / Wellness Kit    Many people find that medication and therapy are helpful treatments for managing depression. In addition, making small changes to your everyday life can help to boost your mood and improve your wellbeing. Below are some tips for you to consider. Be sure to talk with your medical provider and/or behavioral health consultant if your symptoms are worsening or not improving.     Sleep   Sleep hygiene  means all of the habits that support good, restful sleep. It includes maintaining a consistent bedtime and wake time, using your bedroom only for sleeping or sex, and keeping the bedroom dark and free of distractions like a computer, smartphone, or television.     Develop a Healthy Routine  Maintain good hygiene. Get out of bed in the morning, make your bed, brush your teeth, take a shower, and get dressed. Don t spend too much time viewing media that makes you feel stressed. Find time to relax each day.    Exercise  Get some form of exercise every day. This will help reduce pain and release endorphins, the  feel good  chemicals in your brain. It can be as simple as just going for a walk or doing some gardening, anything that will get you moving.      Diet  Strive to eat healthy foods, including fruits and vegetables. Drink plenty of water. Avoid excessive sugar, caffeine, alcohol, and other mood-altering substances.     Stay Connected with Others  Stay in touch with friends and family members.    Manage Your Mood  Try deep breathing, massage therapy, biofeedback, or meditation. Take part in fun activities when you can. Try to find something to smile about each day.     Psychotherapy  Be open to working with a therapist if your provider recommends  it.     Medication  Be sure to take your medication as prescribed. Most anti-depressants need to be taken every day. It usually takes several weeks for medications to work. Not all medicines work for all people. It is important to follow-up with your provider to make sure you have a treatment plan that is working for you. Do not stop your medication abruptly without first discussing it with your provider.    Crisis Resources   These hotlines are for both adults and children. They and are open 24 hours a day, 7 days a week unless noted otherwise.      National Suicide Prevention Lifeline   9-217-549-TALK (0376)      Crisis Text Line    www.crisistextline.org  Text HOME to 106896 from anywhere in the [place], anytime, about any type of crisis. A live, trained crisis counselor will receive the text and respond quickly.      [place] Lifeline for LGBTQ Youth  A national crisis intervention and suicide lifeline for LGBTQ youth under 25. Provides a safe place to talk without judgement. Call 1-711.948.5427; text START to 311728 or visit www.thetrevorproject.org to talk to a trained counselor.      For [facility] crisis numbers, visit the [facility] website at:  https://[place].gov/dhs/people-we-serve/adults/health-care/mental-health/resources/crisis-contacts.jsp

## 2022-03-30 NOTE — PROGRESS NOTES
Ayleen is a 41 year old who is being evaluated via a billable video visit.      How would you like to obtain your AVS? MyChart  If the video visit is dropped, the invitation should be resent by: Text to cell phone: [phone]  Will anyone else be joining your video visit? No      Video Start Time: 8:16 AM    Assessment & Plan     History of motion sickness    - scopolamine (TRANSDERM) 1 MG/3DAYS 72 hr patch; Place 1 patch onto the skin every 72 hours  - diazepam (VALIUM) 5 MG tablet; Take 5 mg orally 30 minutes prior to flight    Anxiety  Well controlled, continue current medications  - sertraline (ZOLOFT) 100 MG tablet; Take 1 tabet daily    Mild major depression (H)  As above  - sertraline (ZOLOFT) 100 MG tablet; Take 1 tabet daily    Surveillance of previously prescribed contraceptive pill  Continue current pill without change  - norgestim-eth estrad triphasic (TRINESSA LO) 0.18/0.215/0.25 MG-25 MCG tablet; Take 1 tablet by mouth daily    Mild intermittent asthma without complication  Well controlled, continue current medications  - PROAIR  (90 Base) MCG/ACT inhaler; INHALE 1 TO 2 PUFFS BY MOUTH INTO LUNGS EVERY 6 HOURS AS NEEDED  - cetirizine (ZYRTEC) 10 MG tablet; Take 1 tablet (10 mg) by mouth daily    Seasonal allergic rhinitis, unspecified trigger  Recommend starting Zyrtec daily as weather warms up and continuing throughout summer months.  - cetirizine (ZYRTEC) 10 MG tablet; Take 1 tablet (10 mg) by mouth daily    Prescription drug management         See Patient Instructions    No follow-ups on file.    DARIN Duran [facility]    Amie Abbasi is a 41 year old who presents for the following health issues     History of Present Illness       Reason for visit:  Motion sickness medication for upcoming travel  Symptom onset:  Today  Symptoms include:  None    She eats 4 or more servings of fruits and vegetables daily.She consumes 0 sweetened beverage(s)  daily.She exercises with enough effort to increase her heart rate 60 or more minutes per day.  She exercises with enough effort to increase her heart rate 4 days per week.   She is taking medications regularly.     Patient requesting Scopolamine and Valium for upcoming air travel- 2 trips planned.    Asthma: Uses Albuterol 1-2 times per week. Allergies get worse in the summer. Takes Zyrtec in summer which helps, but takes only with triggers.    Anxiety and depression are well controlled on current medication. Working from home has also really helped.      Review of Systems   Constitutional, HEENT, cardiovascular, pulmonary, gi and gu systems are negative, except as otherwise noted.      Objective           Vitals:  No vitals were obtained today due to virtual visit.    Physical Exam   GENERAL: Healthy, alert and no distress  EYES: Eyes grossly normal to inspection.  No discharge or erythema, or obvious scleral/conjunctival abnormalities.  RESP: No audible wheeze, cough, or visible cyanosis.  No visible retractions or increased work of breathing.    SKIN: Visible skin clear. No significant rash, abnormal pigmentation or lesions.  NEURO: Cranial nerves grossly intact.  Mentation and speech appropriate for age.  PSYCH: Mentation appears normal, affect normal/bright, judgement and insight intact, normal speech and appearance well-groomed.    No results found for any visits on 03/30/22.            Video-Visit Details    Type of service:  Video Visit    Video End Time:8:27 AM    Originating Location (pt. Location): Home    Distant Location (provider location):  [facility]     Platform used for Video Visit: [employer]

## 2022-04-17 NOTE — TELEPHONE ENCOUNTER
Mailed ACT today. Awaiting for ACT.  Etta Christianson MA     Regarding: wi-tooth pain  ----- Message from Mare De La Cruz sent at 4/17/2022  4:55 PM CDT -----  Patient Name: Rylie Jones    Full Name of Provider seen for current symptoms:rose mary alonso    Symptoms: tooth pain    Pregnant (If Yes, how long?):na    Call Back #:[phone]    Call Center Account # for provider seen for current symptoms:[id number]    Which State are you currently located in? (enter State name in Summary field): [place]    Patients needing callback from the RN are informed of the following:   “Please be aware your return call may come from an unidentified phone number. Please answer all incoming calls until you hear from our nurse. Our callback times vary based on call volumes. If at any time your condition becomes worse, you should seek immediate medical assistance by calling 911 or going to an Urgent Care or Emergency Department for evaluation.”

## 2022-05-21 ENCOUNTER — HEALTH MAINTENANCE LETTER (OUTPATIENT)
Age: 42
End: 2022-05-21

## 2022-07-17 ENCOUNTER — HOSPITAL ENCOUNTER (EMERGENCY)
Facility: HOSPITAL | Age: 42
Discharge: HOME | End: 2022-07-17
Admitting: PHYSICIAN ASSISTANT
Payer: COMMERCIAL

## 2022-07-17 VITALS
TEMPERATURE: 98.1 F | HEIGHT: 67 IN | DIASTOLIC BLOOD PRESSURE: 67 MMHG | WEIGHT: 170 LBS | HEART RATE: 92 BPM | BODY MASS INDEX: 26.68 KG/M2 | OXYGEN SATURATION: 98 % | RESPIRATION RATE: 18 BRPM | SYSTOLIC BLOOD PRESSURE: 157 MMHG

## 2022-07-17 DIAGNOSIS — S01.01XA LACERATION OF SCALP, INITIAL ENCOUNTER: ICD-10-CM

## 2022-07-17 PROCEDURE — 99282 EMERGENCY DEPT VISIT SF MDM: CPT

## 2022-07-17 ASSESSMENT — ENCOUNTER SYMPTOMS
WOUND: 1
VOMITING: 0
FEVER: 0
NECK PAIN: 0
NAUSEA: 0
HEADACHES: 0
CHILLS: 0

## 2022-07-17 NOTE — ED PROVIDER NOTES
EMERGENCY DEPARTMENT ENCOUNTER      NAME: Ayleen Ramos  AGE: 42 year old female  YOB: 1980  MRN: [id number]  EVALUATION DATE & TIME: No admission date for patient encounter.    PCP: Beverly Blount    ED PROVIDER: Alyssa Sandoval PA-C      Chief Complaint   Patient presents with     Foreign Body in Skin     scalp         FINAL IMPRESSION:  1. Laceration of scalp, initial encounter          MEDICAL DECISION MAKING:    Pertinent Labs & Imaging studies reviewed. (See chart for details)  42 year old female presents to the Emergency   Department for evaluation of foreign body in scalp. Patient hit her head on a wooden beam 15 days ago. She has had some tenderness to the area ever since and today when brushing her hair she felt something sharp in her scalp.     Vitals reviewed and notable for elevated blood pressure. On exam, vertex of scalp with 2 cm hard scab that was loosely attached to scalp and hair. No visualized foreign bodies. Skin under scab is well healed, pink in color. Mild tenderness to palpation. No palpable depression. Confirmed with patient's  that this is what he thought to be a piece of wood. Reassured patient that no foreign body was visualized and no signs of infection. Patient discharged home in stable condition.     0 minutes of critical care time     ED COURSE:  10:18 AM I met with the patient, obtained history, performed an initial exam, and discussed options and plan for diagnostics and treatment here in the ED.  10:38 AM Patient discharged after being provided with extensive anticipatory guidance and given return precautions, importance of PCP follow-up emphasized.    At the conclusion of the encounter I discussed the results of all of the tests and the disposition. The questions were answered. The patient acknowledged understanding and was agreeable with the care plan.    MEDICATIONS GIVEN IN THE EMERGENCY:  Medications - No data to display    NEW PRESCRIPTIONS  STARTED AT TODAY'S ER VISIT  New Prescriptions    No medications on file            =================================================================    HPI:    Patient information was obtained from: Patient    Use of Interpretor: N/A      Ayleen Ramos is a 42 year old female with a pertinent history of asthma, depression who presents to this ED via private vehicle for evaluation of foreign body under skin.    Patient hit her head on a wooden beam on 7/2 (15 days ago). No loss of consciousness. She has had tenderness to the scalp ever since and while brushing her hair she saw a piece of wood working its way out of her scalp.      REVIEW OF SYSTEMS:  Review of Systems   Constitutional: Negative for chills and fever.   Gastrointestinal: Negative for nausea and vomiting.   Musculoskeletal: Negative for neck pain.   Skin: Positive for wound (scalp foreign body).   Neurological: Negative for syncope and headaches.   All other systems reviewed and are negative.      PAST MEDICAL HISTORY:  No past medical history on file.    PAST SURGICAL HISTORY:  No past surgical history on file.        CURRENT MEDICATIONS:    No current facility-administered medications for this encounter.    Current Outpatient Medications:      cetirizine (ZYRTEC) 10 MG tablet, Take 1 tablet (10 mg) by mouth daily, Disp: 90 tablet, Rfl: 3     diazepam (VALIUM) 5 MG tablet, Take 5 mg orally 30 minutes prior to flight, Disp: 10 tablet, Rfl: 0     norgestim-eth estrad triphasic (TRINESSA LO) 0.18/0.215/0.25 MG-25 MCG tablet, Take 1 tablet by mouth daily, Disp: 84 tablet, Rfl: 3     PROAIR  (90 Base) MCG/ACT inhaler, INHALE 1 TO 2 PUFFS BY MOUTH INTO LUNGS EVERY 6 HOURS AS NEEDED, Disp: 8.5 g, Rfl: 1     scopolamine (TRANSDERM) 1 MG/3DAYS 72 hr patch, Place 1 patch onto the skin every 72 hours, Disp: 4 patch, Rfl: 0     sertraline (ZOLOFT) 100 MG tablet, Take 1 tabet daily, Disp: 90 tablet, Rfl: 3     vitamin D3 (CHOLECALCIFEROL) 2000 units (50  "mcg) tablet, Take 1 tablet by mouth daily, Disp: , Rfl:       ALLERGIES:  Allergies   Allergen Reactions     Sulfa Drugs Hives     Wellbutrin [Bupropion] Other (See Comments)     Severe constipation        FAMILY HISTORY:  Family History   Problem Relation Age of Onset     Heart Defect Father         Enlarge Heart     Bladder Cancer Maternal Grandmother      Influenza/Pneumonia Maternal Grandfather      Colon Cancer Paternal Grandmother      Celiac Disease Sister      Thyroid Disease Sister        SOCIAL HISTORY:   Social History     Socioeconomic History     Marital status: Single   Tobacco Use     Smoking status: Former Smoker     Quit date: 2000     Years since quittin.5     Smokeless tobacco: Never Used   Substance and Sexual Activity     Alcohol use: Yes     Drug use: No     Sexual activity: Not Currently       VITALS:  Patient Vitals for the past 24 hrs:   BP Temp Temp src Pulse Resp SpO2 Height Weight   22 0849 (!) 157/67 98.1  F (36.7  C) Temporal 92 18 98 % 1.702 m (5' 7\") 77.1 kg (170 lb)       PHYSICAL EXAM  Constitutional: Well developed, Well nourished, NAD  HENT: Normocephalic. Vertex of scalp with 2 cm hard scab that was loosely attached to scalp and hair. No visualized foreign bodies. Skin under scab is well healed, pink in color. Mild tenderness to palpation. No palpable depression.  Bilateral external ears normal, negative tucker sign. Wearing mask.  Neck: Normal range of motion, No midline cervical spine tenderness, Supple, No stridor.  Eyes: PERRL, EOMI, Conjunctiva normal, No discharge.   Respiratory: No respiratory distress, Speaks full sentences easily. No cough.  Cardiovascular: Normal heart rate  GI: non-distended.  Musculoskeletal: No edema. No cyanosis, No clubbing. Good range of motion in all major joints.  Integument: Warm, Dry, No erythema, No rash. No petechiae.  Neurologic: Alert & oriented x 3, Normal motor function, Normal sensory function, No focal deficits noted. " Normal gait.  Psychiatric: Affect normal, Judgment normal, Mood normal. Cooperative.      Alyssa Sandoval PA-C  Emergency Medicine  [facility]  7/17/2022     Alyssa Sandoval PA-C  07/17/22 1917

## 2022-07-17 NOTE — ED TRIAGE NOTES
"Pt presents with \"a piece of wood in my head.\" Pt hit her head on a wooden beam on 7/2 while on vacation. Pt has had tenderness since then and while brushing hair she noted a piece of wood working its way out of her scalp.      Triage Assessment     Row Name 07/17/22 0851       Triage Assessment (Adult)    Airway WDL WDL       Respiratory WDL    Respiratory WDL WDL       Skin Circulation/Temperature WDL    Skin Circulation/Temperature WDL WDL       Cardiac WDL    Cardiac WDL WDL       Peripheral/Neurovascular WDL    Peripheral Neurovascular WDL WDL       Cognitive/Neuro/Behavioral WDL    Cognitive/Neuro/Behavioral WDL WDL              "

## 2022-07-17 NOTE — DISCHARGE INSTRUCTIONS
Tylenol 1000 mg every 8 hours as needed  Ibuprofen 600 mg every 8 hours as needed  You can alternate the two of them.     I believe what you were feeling was the scab. This was removed and the underlying skin is healing well. This will be tender to touch for a few weeks. Follow up with primary care provider as needed if symptoms persist.     If you develop sudden onset severe headache, fevers, vision changes, vomiting, return to ED.

## 2022-08-22 ENCOUNTER — MYC MEDICAL ADVICE (OUTPATIENT)
Dept: FAMILY MEDICINE | Facility: CLINIC | Age: 42
End: 2022-08-22

## 2022-08-22 DIAGNOSIS — J45.20 MILD INTERMITTENT ASTHMA WITHOUT COMPLICATION: Primary | ICD-10-CM

## 2022-08-23 NOTE — TELEPHONE ENCOUNTER
Please see Qire message/request. Cued generic albuterol if okay.    Imelda Urbano RN   [facility]

## 2022-08-24 RX ORDER — ALBUTEROL SULFATE 90 UG/1
2 AEROSOL, METERED RESPIRATORY (INHALATION) EVERY 6 HOURS PRN
Qty: 18 G | Refills: 1 | Status: SHIPPED | OUTPATIENT
Start: 2022-08-24 | End: 2022-12-06

## 2022-09-11 ENCOUNTER — HEALTH MAINTENANCE LETTER (OUTPATIENT)
Age: 42
End: 2022-09-11

## 2022-12-02 ASSESSMENT — ASTHMA QUESTIONNAIRES
QUESTION_1 LAST FOUR WEEKS HOW MUCH OF THE TIME DID YOUR ASTHMA KEEP YOU FROM GETTING AS MUCH DONE AT WORK, SCHOOL OR AT HOME: A LITTLE OF THE TIME
ACT_TOTALSCORE: 21
QUESTION_2 LAST FOUR WEEKS HOW OFTEN HAVE YOU HAD SHORTNESS OF BREATH: ONCE OR TWICE A WEEK
QUESTION_5 LAST FOUR WEEKS HOW WOULD YOU RATE YOUR ASTHMA CONTROL: WELL CONTROLLED
ACT_TOTALSCORE: 21
QUESTION_3 LAST FOUR WEEKS HOW OFTEN DID YOUR ASTHMA SYMPTOMS (WHEEZING, COUGHING, SHORTNESS OF BREATH, CHEST TIGHTNESS OR PAIN) WAKE YOU UP AT NIGHT OR EARLIER THAN USUAL IN THE MORNING: NOT AT ALL
QUESTION_4 LAST FOUR WEEKS HOW OFTEN HAVE YOU USED YOUR RESCUE INHALER OR NEBULIZER MEDICATION (SUCH AS ALBUTEROL): ONCE A WEEK OR LESS

## 2022-12-02 NOTE — PROGRESS NOTES
"Ayleen is a 42 year old who is being evaluated via a billable video visit.      How would you like to obtain your AVS? MyChart  If the video visit is dropped, the invitation should be resent by: Text to cell phone: [phone]  Will anyone else be joining your video visit? No          Assessment & Plan     (J45.20) Mild intermittent asthma without complication  (primary encounter diagnosis)  Comment: Chronic, controlled. No recent hospitalization nor flare-ups. Discussion had regarding triggers and ways patient is eliminating them. Will send refill to pharmacy for pickup  Plan: albuterol (PROAIR HFA/PROVENTIL HFA/VENTOLIN         HFA) 108 (90 Base) MCG/ACT inhaler        Pending pickup of medications from pharmacy      (Z30.41) Surveillance of previously prescribed contraceptive pill  Comment: Chronic, controlled. No side effects with use of birth control.   Plan: norgestim-eth estrad triphasic (TRINESSA LO)         0.18/0.215/0.25 MG-25 MCG tablet        Pending pickup of medications from pharmacy      (F41.9) Anxiety  Comment: Chronic, controlled. Will pt compliant with medication with no known side effects. Last NIDA-7 noted to be 5 in April.  Will send refill to pharmacy   Plan: sertraline (ZOLOFT) 100 MG tablet        Pending pickup of medications from pharmacy      (F32.0) Mild major depression (H)  Comment: Chronic, controlled. PHQ-9 score today is a 3. Pt compliant with medication. Will send refills to pharmacy   Plan: sertraline (ZOLOFT) 100 MG tablet        Pending pickup of medications from pharmacy                 BMI:   Estimated body mass index is 26.63 kg/m  as calculated from the following:    Height as of 7/17/22: 1.702 m (5' 7\").    Weight as of 7/17/22: 77.1 kg (170 lb).       See Patient Instructions    Return in about 3 months (around 3/6/2023) for Routine preventive.    SANYA MONAHAN MD  [facility]    Amie Abbasi is a 42 year old, presenting for the following " health issues:  Establish Care      History of Present Illness       Reason for visit:  My PCP no longer is at this location. Dr. Boothe was provided as a PCP accepting new patients. This appointment is to establish her as a PCP, and renew my prescriptions before they .    She eats 4 or more servings of fruits and vegetables daily.She consumes 0 sweetened beverage(s) daily.She exercises with enough effort to increase her heart rate 9 or less minutes per day.  She exercises with enough effort to increase her heart rate 3 or less days per week.   She is taking medications regularly.    Today's PHQ-9         PHQ-9 Total Score: 3    PHQ-9 Q9 Thoughts of better off dead/self-harm past 2 weeks :   Not at all    How difficult have these problems made it for you to do your work, take care of things at home, or get along with other people: Not difficult at all       Depression and Anxiety Follow-Up    How are you doing with your depression since your last visit? Stable.    How are you doing with your anxiety since your last visit?  Stable.     Are you having other symptoms that might be associated with depression or anxiety? No    Have you had a significant life event? No     Do you have any concerns with your use of alcohol or other drugs? No    Social History     Tobacco Use     Smoking status: Former     Types: Cigarettes     Quit date: 2000     Years since quittin.9     Smokeless tobacco: Never   Substance Use Topics     Alcohol use: Yes     Drug use: No     PHQ 2021 4/3/2022 2022   PHQ-9 Total Score 9 2 3   Q9: Thoughts of better off dead/self-harm past 2 weeks Not at all Not at all Not at all     NIDA-7 SCORE 2020 2021 4/3/2022   Total Score - - 5 (mild anxiety)   Total Score 0 1 5     Last PHQ-9 2022   1.  Little interest or pleasure in doing things 0   2.  Feeling down, depressed, or hopeless 1   3.  Trouble falling or staying asleep, or sleeping too much 0   4.  Feeling tired or  having little energy 1   5.  Poor appetite or overeating 0   6.  Feeling bad about yourself 1   7.  Trouble concentrating 0   8.  Moving slowly or restless 0   Q9: Thoughts of better off dead/self-harm past 2 weeks 0   PHQ-9 Total Score 3   Difficulty at work, home, or with people -     NIDA-7  4/3/2022   1. Feeling nervous, anxious, or on edge 1   2. Not being able to stop or control worrying 1   3. Worrying too much about different things 1   4. Trouble relaxing 1   5. Being so restless that it is hard to sit still 0   6. Becoming easily annoyed or irritable 1   7. Feeling afraid, as if something awful might happen 0   NIDA-7 Total Score 5   If you checked any problems, how difficult have they made it for you to do your work, take care of things at home, or get along with other people? -       Suicide Assessment Five-step Evaluation and Treatment (SAFE-T)          Asthma Follow-Up    Was ACT completed today?  No      Do you have a cough?  No    Are you experiencing any wheezing in your chest?  No    Do you have any shortness of breath?  No     How often are you using a short-acting (rescue) inhaler or nebulizer, such as Albuterol?  A few times a week    How many days per week do you miss taking your asthma controller medication?  0    Please describe any recent triggers for your asthma: Weather and dust mites.     Have you had any Emergency Room Visits, Urgent Care Visits, or Hospital Admissions since your last office visit?  No  Patient reports use of her rescue inhaler twice a week or sometimes twice every two weeks. She reports use of vacuuming and having an air purifier and mattress  Protectors help keep her symptoms down     Birth Control  Patient reports that's he has been on this birth control for the last 4 years. She denies any side effects to the medication and states her period has been normal with no concerns.            Review of Systems   CONSTITUTIONAL: NEGATIVE for fever, chills, change in  weight  ENT/MOUTH: NEGATIVE for ear, mouth and throat problems  RESP: NEGATIVE for significant cough or SOB  CV: NEGATIVE for chest pain, palpitations or peripheral edema  PSYCHIATRIC: NEGATIVE for changes in mood or affect      Objective           Vitals:  No vitals were obtained today due to virtual visit.    Physical Exam   GENERAL: Healthy, alert and no distress  EYES: Eyes grossly normal to inspection.  No discharge or erythema, or obvious scleral/conjunctival abnormalities.  RESP: No audible wheeze, cough, or visible cyanosis.  No visible retractions or increased work of breathing.    SKIN: Visible skin clear. No significant rash, abnormal pigmentation or lesions.  NEURO: Cranial nerves grossly intact.  Mentation and speech appropriate for age.  PSYCH: Mentation appears normal, affect normal/bright, judgement and insight intact, normal speech and appearance well-groomed.    No results found for any visits on 12/06/22.            Video-Visit Details    Video Start Time: 10: 40 AM    Type of service:  Video Visit    Video End Time:10:56 AM    Originating Location (pt. Location): Home        Distant Location (provider location):  On-site    Platform used for Video Visit: [employer]

## 2022-12-06 ENCOUNTER — VIRTUAL VISIT (OUTPATIENT)
Dept: FAMILY MEDICINE | Facility: CLINIC | Age: 42
End: 2022-12-06
Payer: COMMERCIAL

## 2022-12-06 DIAGNOSIS — Z30.41 SURVEILLANCE OF PREVIOUSLY PRESCRIBED CONTRACEPTIVE PILL: ICD-10-CM

## 2022-12-06 DIAGNOSIS — J45.20 MILD INTERMITTENT ASTHMA WITHOUT COMPLICATION: Primary | ICD-10-CM

## 2022-12-06 DIAGNOSIS — F32.0 MILD MAJOR DEPRESSION (H): ICD-10-CM

## 2022-12-06 DIAGNOSIS — F41.9 ANXIETY: ICD-10-CM

## 2022-12-06 PROBLEM — T75.3XXA SEVERE MOTION SICKNESS: Status: ACTIVE | Noted: 2022-12-06

## 2022-12-06 PROCEDURE — 99214 OFFICE O/P EST MOD 30 MIN: CPT | Mod: 95 | Performed by: STUDENT IN AN ORGANIZED HEALTH CARE EDUCATION/TRAINING PROGRAM

## 2022-12-06 RX ORDER — SERTRALINE HYDROCHLORIDE 100 MG/1
TABLET, FILM COATED ORAL
Qty: 90 TABLET | Refills: 3 | Status: SHIPPED | OUTPATIENT
Start: 2022-12-06 | End: 2024-06-05

## 2022-12-06 RX ORDER — ALBUTEROL SULFATE 90 UG/1
2 AEROSOL, METERED RESPIRATORY (INHALATION) EVERY 6 HOURS PRN
Qty: 18 G | Refills: 3 | Status: SHIPPED | OUTPATIENT
Start: 2022-12-06 | End: 2024-03-11

## 2022-12-06 RX ORDER — NORGESTIMATE AND ETHINYL ESTRADIOL 7DAYSX3 LO
1 KIT ORAL DAILY
Qty: 84 TABLET | Refills: 3 | Status: SHIPPED | OUTPATIENT
Start: 2022-12-06 | End: 2023-12-26

## 2022-12-06 ASSESSMENT — PATIENT HEALTH QUESTIONNAIRE - PHQ9
10. IF YOU CHECKED OFF ANY PROBLEMS, HOW DIFFICULT HAVE THESE PROBLEMS MADE IT FOR YOU TO DO YOUR WORK, TAKE CARE OF THINGS AT HOME, OR GET ALONG WITH OTHER PEOPLE: NOT DIFFICULT AT ALL
SUM OF ALL RESPONSES TO PHQ QUESTIONS 1-9: 3
SUM OF ALL RESPONSES TO PHQ QUESTIONS 1-9: 3

## 2023-04-05 ASSESSMENT — ENCOUNTER SYMPTOMS
MYALGIAS: 0
HEADACHES: 0
ABDOMINAL PAIN: 0
JOINT SWELLING: 0
FREQUENCY: 0
HEMATURIA: 0
NAUSEA: 0
SORE THROAT: 0
CHILLS: 0
NERVOUS/ANXIOUS: 0
HEARTBURN: 0
DIARRHEA: 0
DYSURIA: 0
COUGH: 0
FEVER: 0
WEAKNESS: 0
DIZZINESS: 0
PARESTHESIAS: 0
CONSTIPATION: 0
SHORTNESS OF BREATH: 0
EYE PAIN: 0
BREAST MASS: 0
HEMATOCHEZIA: 0
PALPITATIONS: 0
ARTHRALGIAS: 0

## 2023-04-11 ENCOUNTER — OFFICE VISIT (OUTPATIENT)
Dept: FAMILY MEDICINE | Facility: CLINIC | Age: 43
End: 2023-04-11
Payer: COMMERCIAL

## 2023-04-11 VITALS
HEART RATE: 67 BPM | BODY MASS INDEX: 28.5 KG/M2 | WEIGHT: 181.6 LBS | DIASTOLIC BLOOD PRESSURE: 84 MMHG | OXYGEN SATURATION: 99 % | RESPIRATION RATE: 16 BRPM | HEIGHT: 67 IN | TEMPERATURE: 97.9 F | SYSTOLIC BLOOD PRESSURE: 126 MMHG

## 2023-04-11 DIAGNOSIS — Z11.4 SCREENING FOR HIV (HUMAN IMMUNODEFICIENCY VIRUS): ICD-10-CM

## 2023-04-11 DIAGNOSIS — Z87.898 HISTORY OF MOTION SICKNESS: ICD-10-CM

## 2023-04-11 DIAGNOSIS — Z11.59 NEED FOR HEPATITIS C SCREENING TEST: ICD-10-CM

## 2023-04-11 DIAGNOSIS — Z12.31 ENCOUNTER FOR SCREENING MAMMOGRAM FOR BREAST CANCER: ICD-10-CM

## 2023-04-11 DIAGNOSIS — Z00.00 ROUTINE GENERAL MEDICAL EXAMINATION AT A HEALTH CARE FACILITY: Primary | ICD-10-CM

## 2023-04-11 DIAGNOSIS — Z12.4 CERVICAL CANCER SCREENING: ICD-10-CM

## 2023-04-11 LAB
ALBUMIN SERPL-MCNC: 3.6 G/DL (ref 3.4–5)
ALP SERPL-CCNC: 50 U/L (ref 40–150)
ALT SERPL W P-5'-P-CCNC: 29 U/L (ref 0–50)
ANION GAP SERPL CALCULATED.3IONS-SCNC: 6 MMOL/L (ref 3–14)
AST SERPL W P-5'-P-CCNC: 16 U/L (ref 0–45)
BILIRUB SERPL-MCNC: 0.4 MG/DL (ref 0.2–1.3)
BUN SERPL-MCNC: 8 MG/DL (ref 7–30)
CALCIUM SERPL-MCNC: 9.2 MG/DL (ref 8.5–10.1)
CHLORIDE BLD-SCNC: 105 MMOL/L (ref 94–109)
CHOLEST SERPL-MCNC: 209 MG/DL
CO2 SERPL-SCNC: 24 MMOL/L (ref 20–32)
CREAT SERPL-MCNC: 0.6 MG/DL (ref 0.52–1.04)
DEPRECATED CALCIDIOL+CALCIFEROL SERPL-MC: 67 UG/L (ref 20–75)
FASTING STATUS PATIENT QL REPORTED: YES
GFR SERPL CREATININE-BSD FRML MDRD: >90 ML/MIN/1.73M2
GLUCOSE BLD-MCNC: 97 MG/DL (ref 70–99)
HCV AB SERPL QL IA: NONREACTIVE
HDLC SERPL-MCNC: 67 MG/DL
HIV 1+2 AB+HIV1 P24 AG SERPL QL IA: NONREACTIVE
LDLC SERPL CALC-MCNC: 116 MG/DL
NONHDLC SERPL-MCNC: 142 MG/DL
POTASSIUM BLD-SCNC: 3.6 MMOL/L (ref 3.4–5.3)
PROT SERPL-MCNC: 7.8 G/DL (ref 6.8–8.8)
SODIUM SERPL-SCNC: 135 MMOL/L (ref 133–144)
TRIGL SERPL-MCNC: 132 MG/DL
TSH SERPL DL<=0.005 MIU/L-ACNC: 1.84 MU/L (ref 0.4–4)

## 2023-04-11 PROCEDURE — 87624 HPV HI-RISK TYP POOLED RSLT: CPT | Performed by: STUDENT IN AN ORGANIZED HEALTH CARE EDUCATION/TRAINING PROGRAM

## 2023-04-11 PROCEDURE — 80053 COMPREHEN METABOLIC PANEL: CPT | Performed by: STUDENT IN AN ORGANIZED HEALTH CARE EDUCATION/TRAINING PROGRAM

## 2023-04-11 PROCEDURE — 86803 HEPATITIS C AB TEST: CPT | Performed by: STUDENT IN AN ORGANIZED HEALTH CARE EDUCATION/TRAINING PROGRAM

## 2023-04-11 PROCEDURE — 80061 LIPID PANEL: CPT | Performed by: STUDENT IN AN ORGANIZED HEALTH CARE EDUCATION/TRAINING PROGRAM

## 2023-04-11 PROCEDURE — 87389 HIV-1 AG W/HIV-1&-2 AB AG IA: CPT | Performed by: STUDENT IN AN ORGANIZED HEALTH CARE EDUCATION/TRAINING PROGRAM

## 2023-04-11 PROCEDURE — 99396 PREV VISIT EST AGE 40-64: CPT | Performed by: STUDENT IN AN ORGANIZED HEALTH CARE EDUCATION/TRAINING PROGRAM

## 2023-04-11 PROCEDURE — G0145 SCR C/V CYTO,THINLAYER,RESCR: HCPCS | Performed by: STUDENT IN AN ORGANIZED HEALTH CARE EDUCATION/TRAINING PROGRAM

## 2023-04-11 PROCEDURE — 36415 COLL VENOUS BLD VENIPUNCTURE: CPT | Performed by: STUDENT IN AN ORGANIZED HEALTH CARE EDUCATION/TRAINING PROGRAM

## 2023-04-11 PROCEDURE — 84443 ASSAY THYROID STIM HORMONE: CPT | Performed by: STUDENT IN AN ORGANIZED HEALTH CARE EDUCATION/TRAINING PROGRAM

## 2023-04-11 PROCEDURE — 82306 VITAMIN D 25 HYDROXY: CPT | Performed by: STUDENT IN AN ORGANIZED HEALTH CARE EDUCATION/TRAINING PROGRAM

## 2023-04-11 PROCEDURE — 99213 OFFICE O/P EST LOW 20 MIN: CPT | Mod: 25 | Performed by: STUDENT IN AN ORGANIZED HEALTH CARE EDUCATION/TRAINING PROGRAM

## 2023-04-11 RX ORDER — SCOLOPAMINE TRANSDERMAL SYSTEM 1 MG/1
1 PATCH, EXTENDED RELEASE TRANSDERMAL
Qty: 4 PATCH | Refills: 2 | Status: SHIPPED | OUTPATIENT
Start: 2023-04-11 | End: 2024-06-25

## 2023-04-11 RX ORDER — DIAZEPAM 5 MG
TABLET ORAL
Qty: 10 TABLET | Refills: 0 | Status: SHIPPED | OUTPATIENT
Start: 2023-04-11 | End: 2023-06-06

## 2023-04-11 ASSESSMENT — ENCOUNTER SYMPTOMS
PALPITATIONS: 0
MYALGIAS: 0
WEAKNESS: 0
DIARRHEA: 0
CHILLS: 0
FEVER: 0
DYSURIA: 0
HEARTBURN: 0
PARESTHESIAS: 0
ARTHRALGIAS: 0
NAUSEA: 0
EYE PAIN: 0
SHORTNESS OF BREATH: 0
FREQUENCY: 0
NERVOUS/ANXIOUS: 0
COUGH: 0
CONSTIPATION: 0
HEMATURIA: 0
DIZZINESS: 0
HEMATOCHEZIA: 0
ABDOMINAL PAIN: 0
BREAST MASS: 0
JOINT SWELLING: 0
HEADACHES: 0
SORE THROAT: 0

## 2023-04-11 NOTE — PROGRESS NOTES
SUBJECTIVE:   CC: Ayleen is an 42 year old who presents for preventive health visit.     Patient has been advised of split billing requirements and indicates understanding: Yes  Healthy Habits:     Getting at least 3 servings of Calcium per day:  Yes    Bi-annual eye exam:  NO    Dental care twice a year:  Yes    Sleep apnea or symptoms of sleep apnea:  None    Diet:  Carbohydrate counting, Vegetarian/vegan, Gluten-free/reduced and Other    Frequency of exercise:  4-5 days/week    Duration of exercise:  30-45 minutes    Taking medications regularly:  Yes    Medication side effects:  None    PHQ-2 Total Score: 0    Additional concerns today:  Yes        Travel  Patient reports traveling to [facility] in . She reports a history of motion sickness and states use of the valium and scopolamine patch has helped with her symptoms. She reports wanting refills of the medication prior to going on her travels          Today's PHQ-2 Score:       2023     8:01 PM   PHQ-2 (  [employer])   Q1: Little interest or pleasure in doing things 0   Q2: Feeling down, depressed or hopeless 0   PHQ-2 Score 0   Q1: Little interest or pleasure in doing things Not at all   Q2: Feeling down, depressed or hopeless Not at all   PHQ-2 Score 0       Have you ever done Advance Care Planning? (For example, a Health Directive, POLST, or a discussion with a medical provider or your loved ones about your wishes): No, advance care planning information given to patient to review.  Patient plans to discuss their wishes with loved ones or provider.      Social History     Tobacco Use     Smoking status: Former     Types: Cigarettes     Quit date: 2000     Years since quittin.2     Smokeless tobacco: Never   Vaping Use     Vaping status: Never Used   Substance Use Topics     Alcohol use: Yes           2023     8:01 PM   Alcohol Use   Prescreen: >3 drinks/day or >7 drinks/week? No     Reviewed orders with patient.  Reviewed health  maintenance and updated orders accordingly - Yes  Lab work is in process  Labs reviewed in EPIC    Breast Cancer Screenin/5/2023     8:01 PM   Breast CA Risk Assessment (FHS-7)   Do you have a family history of breast, colon, or ovarian cancer? No / Unknown         Mammogram Screening - Offered annual screening and updated Health Maintenance for mutual plan based on risk factor consideration    Pertinent mammograms are reviewed under the imaging tab.    History of abnormal Pap smear: NO - age 30-65 PAP every 5 years with negative HPV co-testing recommended      Latest Ref Rng & Units 2017    11:40 AM 2017    11:33 AM   PAP / HPV   PAP (Historical)   NIL     HPV 16 DNA NEG^Negative Negative      HPV 18 DNA NEG^Negative Negative      Other HR HPV NEG^Negative Negative        Reviewed and updated as needed this visit by clinical staff   Tobacco  Allergies  Meds              Reviewed and updated as needed this visit by Provider                     Review of Systems   Constitutional: Negative for chills and fever.   HENT: Negative for congestion, ear pain, hearing loss and sore throat.    Eyes: Negative for pain and visual disturbance.   Respiratory: Negative for cough and shortness of breath.    Cardiovascular: Negative for chest pain, palpitations and peripheral edema.   Gastrointestinal: Negative for abdominal pain, constipation, diarrhea, heartburn, hematochezia and nausea.   Breasts:  Negative for tenderness, breast mass and discharge.   Genitourinary: Negative for dysuria, frequency, genital sores, hematuria, pelvic pain, urgency, vaginal bleeding and vaginal discharge.   Musculoskeletal: Negative for arthralgias, joint swelling and myalgias.   Skin: Negative for rash.   Neurological: Negative for dizziness, weakness, headaches and paresthesias.   Psychiatric/Behavioral: Negative for mood changes. The patient is not nervous/anxious.           OBJECTIVE:   /84   Pulse 67   Temp 97.9  " F (36.6  C) (Oral)   Resp 16   Ht 1.702 m (5' 7\")   Wt 82.4 kg (181 lb 9.6 oz)   LMP 03/30/2023 (Exact Date)   SpO2 99%   BMI 28.44 kg/m    Physical Exam  GENERAL: healthy, alert and no distress  EYES: Eyes grossly normal to inspection, PERRL and conjunctivae and sclerae normal  HENT: ear canals and TM's normal, nose and mouth without ulcers or lesions  NECK: no adenopathy, no asymmetry, masses, or scars and thyroid normal to palpation  RESP: lungs clear to auscultation - no rales, rhonchi or wheezes  CV: regular rate and rhythm, normal S1 S2, no S3 or S4, no murmur, click or rub, no peripheral edema and peripheral pulses strong  ABDOMEN: soft, nontender, no hepatosplenomegaly, no masses and bowel sounds normal   (female): normal female external genitalia, normal urethral meatus, vaginal mucosa pink, moist, well rugated, and normal cervix/adnexa/uterus without masses or discharge  MS: no gross musculoskeletal defects noted, no edema  SKIN: no suspicious lesions or rashes  NEURO: Normal strength and tone, mentation intact and speech normal  PSYCH: mentation appears normal, affect normal/bright    Diagnostic Test Results:  Labs reviewed in Epic  No results found for any visits on 04/11/23.    ASSESSMENT/PLAN:   (Z00.00) Routine general medical examination at a health care facility  (primary encounter diagnosis)  Comment: Stable  Plan: REVIEW OF HEALTH MAINTENANCE PROTOCOL ORDERS,         Vitamin D deficiency screening, Lipid panel         reflex to direct LDL Non-fasting, TSH with free        T4 reflex, Comprehensive metabolic panel (BMP +        Alb, Alk Phos, ALT, AST, Total. Bili, TP)        Pending labs      (Z11.4) Screening for HIV (human immunodeficiency virus)  Comment: Stable  Plan: HIV Antigen Antibody Combo        Pending labs      (Z11.59) Need for hepatitis C screening test  Comment: Stable  Plan: Hepatitis C Screen Reflex to HCV RNA Quant and         Genotype        Pending labs      (Z12.4) " Cervical cancer screening  Comment: Stable. Pap smear completed during an office visit.  Adequate sampling taken during Pap smear and sent to lab. Pending pathology results. Patient informed that results will be available via [employer] or they will be contacted by our pap nursing staff- verbalized understanding.   Plan: Pap Screen with HPV - recommended age 30 - 65         years            (Z87.898) History of motion sickness  Comment: Chronic. PMDP reviewed. Will send medications to pharmacy for patient to have prior to travel   Plan: diazepam (VALIUM) 5 MG tablet, scopolamine         (TRANSDERM) 1 MG/3DAYS 72 hr patch        Pending pickup of medications from pharmacy      (Z12.31) Encounter for screening mammogram for breast cancer  Comment: Stable  Plan: *MA Screening Digital Bilateral              Patient has been advised of split billing requirements and indicates understanding: Yes      COUNSELING:  Reviewed preventive health counseling, as reflected in patient instructions        She reports that she quit smoking about 23 years ago. Her smoking use included cigarettes. She has never used smokeless tobacco.          SANYA MONAHAN MD  [facility]

## 2023-04-13 DIAGNOSIS — Z00.00 ROUTINE GENERAL MEDICAL EXAMINATION AT A HEALTH CARE FACILITY: Primary | ICD-10-CM

## 2023-04-13 LAB
BKR LAB AP GYN ADEQUACY: NORMAL
BKR LAB AP GYN INTERPRETATION: NORMAL
BKR LAB AP HPV REFLEX: NORMAL
BKR LAB AP PREVIOUS ABNORMAL: NORMAL
PATH REPORT.COMMENTS IMP SPEC: NORMAL
PATH REPORT.COMMENTS IMP SPEC: NORMAL
PATH REPORT.RELEVANT HX SPEC: NORMAL

## 2023-04-17 LAB
HUMAN PAPILLOMA VIRUS 16 DNA: NEGATIVE
HUMAN PAPILLOMA VIRUS 18 DNA: NEGATIVE
HUMAN PAPILLOMA VIRUS FINAL DIAGNOSIS: NORMAL
HUMAN PAPILLOMA VIRUS OTHER HR: NEGATIVE

## 2023-04-19 ENCOUNTER — LAB (OUTPATIENT)
Dept: LAB | Facility: CLINIC | Age: 43
End: 2023-04-19
Payer: COMMERCIAL

## 2023-04-19 DIAGNOSIS — Z00.00 ROUTINE GENERAL MEDICAL EXAMINATION AT A HEALTH CARE FACILITY: ICD-10-CM

## 2023-04-19 LAB
CHOLEST SERPL-MCNC: 235 MG/DL
FASTING STATUS PATIENT QL REPORTED: YES
HDLC SERPL-MCNC: 69 MG/DL
LDLC SERPL CALC-MCNC: 151 MG/DL
NONHDLC SERPL-MCNC: 166 MG/DL
TRIGL SERPL-MCNC: 74 MG/DL

## 2023-04-19 PROCEDURE — 36415 COLL VENOUS BLD VENIPUNCTURE: CPT

## 2023-04-19 PROCEDURE — 80061 LIPID PANEL: CPT

## 2023-06-06 DIAGNOSIS — Z87.898 HISTORY OF MOTION SICKNESS: ICD-10-CM

## 2023-06-06 RX ORDER — DIAZEPAM 5 MG
TABLET ORAL
Qty: 5 TABLET | Refills: 0 | Status: SHIPPED | OUTPATIENT
Start: 2023-06-06 | End: 2024-06-25

## 2023-12-22 DIAGNOSIS — Z30.41 SURVEILLANCE OF PREVIOUSLY PRESCRIBED CONTRACEPTIVE PILL: ICD-10-CM

## 2023-12-26 RX ORDER — NORGESTIMATE AND ETHINYL ESTRADIOL 7DAYSX3 LO
1 KIT ORAL DAILY
Qty: 84 TABLET | Refills: 0 | Status: SHIPPED | OUTPATIENT
Start: 2023-12-26 | End: 2024-03-11

## 2024-02-24 ENCOUNTER — HEALTH MAINTENANCE LETTER (OUTPATIENT)
Age: 44
End: 2024-02-24

## 2024-03-11 DIAGNOSIS — J45.20 MILD INTERMITTENT ASTHMA WITHOUT COMPLICATION: ICD-10-CM

## 2024-03-11 DIAGNOSIS — Z30.41 SURVEILLANCE OF PREVIOUSLY PRESCRIBED CONTRACEPTIVE PILL: ICD-10-CM

## 2024-03-11 RX ORDER — ALBUTEROL SULFATE 90 UG/1
2 AEROSOL, METERED RESPIRATORY (INHALATION) EVERY 6 HOURS PRN
Qty: 18 G | Refills: 3 | Status: SHIPPED | OUTPATIENT
Start: 2024-03-11 | End: 2024-06-05

## 2024-03-11 RX ORDER — NORGESTIMATE AND ETHINYL ESTRADIOL
1 KIT DAILY
Qty: 84 TABLET | Refills: 0 | Status: SHIPPED | OUTPATIENT
Start: 2024-03-11 | End: 2024-06-03

## 2024-03-12 ENCOUNTER — PATIENT OUTREACH (OUTPATIENT)
Dept: CARE COORDINATION | Facility: CLINIC | Age: 44
End: 2024-03-12
Payer: COMMERCIAL

## 2024-03-26 ENCOUNTER — PATIENT OUTREACH (OUTPATIENT)
Dept: CARE COORDINATION | Facility: CLINIC | Age: 44
End: 2024-03-26
Payer: COMMERCIAL

## 2024-05-31 DIAGNOSIS — Z30.41 SURVEILLANCE OF PREVIOUSLY PRESCRIBED CONTRACEPTIVE PILL: ICD-10-CM

## 2024-06-03 DIAGNOSIS — F41.9 ANXIETY: ICD-10-CM

## 2024-06-03 DIAGNOSIS — F32.0 MILD MAJOR DEPRESSION (H): ICD-10-CM

## 2024-06-03 RX ORDER — SERTRALINE HYDROCHLORIDE 100 MG/1
TABLET, FILM COATED ORAL
Qty: 90 TABLET | Refills: 3 | Status: CANCELLED | OUTPATIENT
Start: 2024-06-03

## 2024-06-03 RX ORDER — NORGESTIMATE AND ETHINYL ESTRADIOL 7DAYSX3 LO
1 KIT ORAL DAILY
Qty: 28 TABLET | Refills: 0 | Status: SHIPPED | OUTPATIENT
Start: 2024-06-03 | End: 2024-06-05

## 2024-06-04 NOTE — TELEPHONE ENCOUNTER
Future Appointments 6/4/2024 - 12/1/2024        Date Visit Type Length Department Provider     6/5/2024  4:30 PM OFFICE VISIT 30 min [facility] Imelda France APRN CNP    Location Instructions:     [facility] is located at [address] [place] [address], one mile north of the exit off of [address] [address]. From [facility], turn east on [address] [place] [address] or [facility] to access the service road that connects to the parking lot. Be sure to enter the building labeled [facility], as another [place] building is across [facility] from the clinic.&nbsp;

## 2024-06-05 ENCOUNTER — VIRTUAL VISIT (OUTPATIENT)
Dept: FAMILY MEDICINE | Facility: CLINIC | Age: 44
End: 2024-06-05
Payer: COMMERCIAL

## 2024-06-05 DIAGNOSIS — Z30.41 SURVEILLANCE OF PREVIOUSLY PRESCRIBED CONTRACEPTIVE PILL: ICD-10-CM

## 2024-06-05 DIAGNOSIS — F32.0 MILD MAJOR DEPRESSION (H): ICD-10-CM

## 2024-06-05 DIAGNOSIS — J45.20 MILD INTERMITTENT ASTHMA WITHOUT COMPLICATION: ICD-10-CM

## 2024-06-05 DIAGNOSIS — F41.9 ANXIETY: ICD-10-CM

## 2024-06-05 DIAGNOSIS — F41.9 ANXIETY: Primary | ICD-10-CM

## 2024-06-05 PROCEDURE — 96127 BRIEF EMOTIONAL/BEHAV ASSMT: CPT | Mod: 95

## 2024-06-05 PROCEDURE — 99214 OFFICE O/P EST MOD 30 MIN: CPT | Mod: 95

## 2024-06-05 RX ORDER — SERTRALINE HYDROCHLORIDE 100 MG/1
150 TABLET, FILM COATED ORAL DAILY
Qty: 60 TABLET | Refills: 1 | Status: SHIPPED | OUTPATIENT
Start: 2024-06-05 | End: 2024-06-06

## 2024-06-05 RX ORDER — ALBUTEROL SULFATE 90 UG/1
2 AEROSOL, METERED RESPIRATORY (INHALATION) EVERY 6 HOURS PRN
Qty: 18 G | Refills: 3 | Status: SHIPPED | OUTPATIENT
Start: 2024-06-05

## 2024-06-05 RX ORDER — NORGESTIMATE AND ETHINYL ESTRADIOL 7DAYSX3 LO
1 KIT ORAL DAILY
Qty: 28 TABLET | Refills: 0 | Status: SHIPPED | OUTPATIENT
Start: 2024-06-05 | End: 2024-06-25

## 2024-06-05 ASSESSMENT — ASTHMA QUESTIONNAIRES
ACT_TOTALSCORE: 22
ACT_TOTALSCORE: 22
QUESTION_2 LAST FOUR WEEKS HOW OFTEN HAVE YOU HAD SHORTNESS OF BREATH: ONCE OR TWICE A WEEK
QUESTION_1 LAST FOUR WEEKS HOW MUCH OF THE TIME DID YOUR ASTHMA KEEP YOU FROM GETTING AS MUCH DONE AT WORK, SCHOOL OR AT HOME: NONE OF THE TIME
QUESTION_3 LAST FOUR WEEKS HOW OFTEN DID YOUR ASTHMA SYMPTOMS (WHEEZING, COUGHING, SHORTNESS OF BREATH, CHEST TIGHTNESS OR PAIN) WAKE YOU UP AT NIGHT OR EARLIER THAN USUAL IN THE MORNING: NOT AT ALL
QUESTION_5 LAST FOUR WEEKS HOW WOULD YOU RATE YOUR ASTHMA CONTROL: WELL CONTROLLED
QUESTION_4 LAST FOUR WEEKS HOW OFTEN HAVE YOU USED YOUR RESCUE INHALER OR NEBULIZER MEDICATION (SUCH AS ALBUTEROL): ONCE A WEEK OR LESS

## 2024-06-05 ASSESSMENT — PATIENT HEALTH QUESTIONNAIRE - PHQ9
10. IF YOU CHECKED OFF ANY PROBLEMS, HOW DIFFICULT HAVE THESE PROBLEMS MADE IT FOR YOU TO DO YOUR WORK, TAKE CARE OF THINGS AT HOME, OR GET ALONG WITH OTHER PEOPLE: SOMEWHAT DIFFICULT
SUM OF ALL RESPONSES TO PHQ QUESTIONS 1-9: 3
SUM OF ALL RESPONSES TO PHQ QUESTIONS 1-9: 3

## 2024-06-05 NOTE — PROGRESS NOTES
Ayleen is a 44 year old who is being evaluated via a billable video visit.    How would you like to obtain your AVS? MyChart  If the video visit is dropped, the invitation should be resent by: Text to cell phone: [phone]  Will anyone else be joining your video visit? No      Assessment & Plan     Anxiety  Stable, zoloft refilled  - sertraline (ZOLOFT) 100 MG tablet; Take 1.5 tablets (150 mg) by mouth daily    Mild major depression (H24)  Stable, zoloft refilled  - sertraline (ZOLOFT) 100 MG tablet; Take 1.5 tablets (150 mg) by mouth daily    Mild intermittent asthma without complication  Stable uses infrequently, albuterol inhaler refilled  - albuterol (PROAIR HFA/PROVENTIL HFA/VENTOLIN HFA) 108 (90 Base) MCG/ACT inhaler; Inhale 2 puffs into the lungs every 6 hours as needed for shortness of breath or wheezing    Surveillance of previously prescribed contraceptive pill  Declines history of migraines or blood clots, has not miss a dose, birth control refilled.   - norgestim-eth estrad triphasic (TRI-LO-KATIANA) 0.18/0.215/0.25 MG-25 MCG tablet; Take 1 tablet by mouth daily                Subjective   Ayleen is a 44 year old, presenting for the following health issues:  Recheck Medication        6/5/2024     4:26 PM   Additional Questions   Roomed by guy rouse     History of Present Illness       Reason for visit:  Refill of medications    She eats 4 or more servings of fruits and vegetables daily.She consumes 0 sweetened beverage(s) daily.She exercises with enough effort to increase her heart rate 30 to 60 minutes per day.  She exercises with enough effort to increase her heart rate 4 days per week.   She is taking medications regularly.         4/3/2022    12:20 PM 12/6/2022    10:11 AM 6/5/2024    10:28 AM   PHQ   PHQ-9 Total Score 2 3 3   Q9: Thoughts of better off dead/self-harm past 2 weeks Not at all Not at all Not at all         1/29/2020     4:23 PM 12/2/2022     7:23 PM 6/5/2024    10:32 AM   ACT Total  Scores   ACT TOTAL SCORE (Goal Greater than or Equal to 20) 22 21 22   In the past 12 months, how many times did you visit the emergency room for your asthma without being admitted to the hospital? 0 0 0   In the past 12 months, how many times were you hospitalized overnight because of your asthma? 0 0 0     Has been on sertraline for 10 years    Uses as needed albuterol allergy season and in the winter    No chance of pregnancy currently using pack. No history of blood clots or migraines.         Objective           Vitals:  No vitals were obtained today due to virtual visit.    Physical Exam   GENERAL: alert and no distress  RESP: No audible wheeze, cough, or visible cyanosis.    SKIN: Visible skin clear. No significant rash, abnormal pigmentation or lesions.  NEURO: Cranial nerves grossly intact.  Mentation and speech appropriate for age.  PSYCH: Appropriate affect, tone, and pace of words          Video-Visit Details    Type of service:  Video Visit   Originating Location (pt. Location): Home    Distant Location (provider location):  On-site  Platform used for Video Visit: Guerda  Signed Electronically by: DARIN Lyles CNP

## 2024-06-06 RX ORDER — SERTRALINE HYDROCHLORIDE 100 MG/1
150 TABLET, FILM COATED ORAL DAILY
Qty: 60 TABLET | Refills: 1 | Status: SHIPPED | OUTPATIENT
Start: 2024-06-06

## 2024-06-06 RX ORDER — SERTRALINE HYDROCHLORIDE 100 MG/1
TABLET, FILM COATED ORAL
Qty: 90 TABLET | Refills: 3 | OUTPATIENT
Start: 2024-06-06

## 2024-06-25 ENCOUNTER — MYC MEDICAL ADVICE (OUTPATIENT)
Dept: FAMILY MEDICINE | Facility: CLINIC | Age: 44
End: 2024-06-25
Payer: COMMERCIAL

## 2024-06-25 DIAGNOSIS — Z87.898 HISTORY OF MOTION SICKNESS: ICD-10-CM

## 2024-06-25 DIAGNOSIS — Z30.41 SURVEILLANCE OF PREVIOUSLY PRESCRIBED CONTRACEPTIVE PILL: ICD-10-CM

## 2024-06-25 RX ORDER — NORGESTIMATE AND ETHINYL ESTRADIOL 7DAYSX3 LO
1 KIT ORAL DAILY
Qty: 84 TABLET | Refills: 0 | Status: SHIPPED | OUTPATIENT
Start: 2024-06-25 | End: 2024-10-04

## 2024-06-25 NOTE — LETTER
My Asthma Action Plan    Name: Ayleen Ramos   YOB: 1980  Date: 6/25/2024   My doctor: DARIN Lyles CNP   My clinic: [facility]        My Rescue Medicine:   Albuterol inhaler (Proair/Ventolin/Proventil HFA)  2-4 puffs EVERY 4 HOURS as needed. Use a spacer if recommended by your provider.   My Asthma Severity:   Intermittent / Exercise Induced  Know your asthma triggers: upper respiratory infections  None          GREEN ZONE   Good Control  I feel good  No cough or wheeze  Can work, sleep and play without asthma symptoms       Take your asthma control medicine every day.     If exercise triggers your asthma, take your rescue medication  15 minutes before exercise or sports, and  During exercise if you have asthma symptoms  Spacer to use with inhaler: If you have a spacer, make sure to use it with your inhaler             YELLOW ZONE Getting Worse  I have ANY of these:  I do not feel good  Cough or wheeze  Chest feels tight  Wake up at night   Keep taking your Green Zone medications  Start taking your rescue medicine:  every 20 minutes for up to 1 hour. Then every 4 hours for 24-48 hours.  If you stay in the Yellow Zone for more than 12-24 hours, contact your doctor.  If you do not return to the Green Zone in 12-24 hours or you get worse, start taking your oral steroid medicine if prescribed by your provider.           RED ZONE Medical Alert - Get Help  I have ANY of these:  I feel awful  Medicine is not helping  Breathing getting harder  Trouble walking or talking  Nose opens wide to breathe       Take your rescue medicine NOW  If your provider has prescribed an oral steroid medicine, start taking it NOW  Call your doctor NOW  If you are still in the Red Zone after 20 minutes and you have not reached your doctor:  Take your rescue medicine again and  Call 911 or go to the emergency room right away    See your regular doctor within 2 weeks of an Emergency Room or  Urgent Care visit for follow-up treatment.          Annual Reminders:  Meet with Asthma Educator,  Flu Shot in the Fall, consider Pneumonia Vaccination for patients with asthma (aged 19 and older).    Pharmacy: [facility]  [address]    Electronically signed by DARIN Lyles CNP   Date: 06/25/24                    Asthma Triggers  How To Control Things That Make Your Asthma Worse    Triggers are things that make your asthma worse.  Look at the list below to help you find your triggers and   what you can do about them. You can help prevent asthma flare-ups by staying away from your triggers.      Trigger                                                          What you can do   Cigarette Smoke  Tobacco smoke can make asthma worse. Do not allow smoking in your home, car or around you.  Be sure no one smokes at a child s day care or school.  If you smoke, ask your health care provider for ways to help you quit.  Ask family members to quit too.  Ask your health care provider for a referral to Quit Plan to help you quit smoking, or call 4-540-746-PLAN.     Colds, Flu, Bronchitis  These are common triggers of asthma. Wash your hands often.  Don t touch your eyes, nose or mouth.  Get a flu shot every year.     Dust Mites  These are tiny bugs that live in cloth or carpet. They are too small to see. Wash sheets and blankets in hot water every week.   Encase pillows and mattress in dust mite proof covers.  Avoid having carpet if you can. If you have carpet, vacuum weekly.   Use a dust mask and HEPA vacuum.   Pollen and Outdoor Mold  Some people are allergic to trees, grass, or weed pollen, or molds. Try to keep your windows closed.  Limit time out doors when pollen count is high.   Ask you health care provider about taking medicine during allergy season.     Animal Dander  Some people are allergic to skin flakes, urine or saliva from pets with fur or feathers. Keep pets  with fur or feathers out of your home.    If you can t keep the pet outdoors, then keep the pet out of your bedroom.  Keep the bedroom door closed.  Keep pets off cloth furniture and away from stuffed toys.     Mice, Rats, and Cockroaches  Some people are allergic to the waste from these pests.   Cover food and garbage.  Clean up spills and food crumbs.  Store grease in the refrigerator.   Keep food out of the bedroom.   Indoor Mold  This can be a trigger if your home has high moisture. Fix leaking faucets, pipes, or other sources of water.   Clean moldy surfaces.  Dehumidify basement if it is damp and smelly.   Smoke, Strong Odors, and Sprays  These can reduce air quality. Stay away from strong odors and sprays, such as perfume, powder, hair spray, paints, smoke incense, paint, cleaning products, candles and new carpet.   Exercise or Sports  Some people with asthma have this trigger. Be active!  Ask your doctor about taking medicine before sports or exercise to prevent symptoms.    Warm up for 5-10 minutes before and after sports or exercise.     Other Triggers of Asthma  Cold air:  Cover your nose and mouth with a scarf.  Sometimes laughing or crying can be a trigger.  Some medicines and food can trigger asthma.

## 2024-06-25 NOTE — TELEPHONE ENCOUNTER
Patient is requesting a 3 month supply of her birth control prescription.    Thank you,  Venice Javier, Pharmacy Technician  [facility] [place]

## 2024-06-26 NOTE — TELEPHONE ENCOUNTER
Pharmacy reports they have the medication although pt will not be able to pick it up until 7/15/24 because of insurance reasons.  Pt notified and is aware that she will  not be able to pick it up until then and is ok with that.  Renato Cutler MA on 6/26/2024 at 5:31 PM

## 2024-07-13 ENCOUNTER — HEALTH MAINTENANCE LETTER (OUTPATIENT)
Age: 44
End: 2024-07-13

## 2024-08-29 ENCOUNTER — VIRTUAL VISIT (OUTPATIENT)
Dept: FAMILY MEDICINE | Facility: CLINIC | Age: 44
End: 2024-08-29
Payer: COMMERCIAL

## 2024-08-29 DIAGNOSIS — T75.3XXA SEVERE MOTION SICKNESS, INITIAL ENCOUNTER: Primary | ICD-10-CM

## 2024-08-29 PROCEDURE — 99213 OFFICE O/P EST LOW 20 MIN: CPT | Mod: 95 | Performed by: PHYSICIAN ASSISTANT

## 2024-08-29 RX ORDER — SCOLOPAMINE TRANSDERMAL SYSTEM 1 MG/1
1 PATCH, EXTENDED RELEASE TRANSDERMAL
Qty: 4 PATCH | Refills: 1 | Status: SHIPPED | OUTPATIENT
Start: 2024-08-29

## 2024-08-29 RX ORDER — DIAZEPAM 5 MG
TABLET ORAL
Qty: 10 TABLET | Refills: 0 | Status: SHIPPED | OUTPATIENT
Start: 2024-08-29

## 2024-08-29 NOTE — PROGRESS NOTES
Ayleen is a 44 year old who is being evaluated via a billable video visit.    How would you like to obtain your AVS? Mail a copy  If the video visit is dropped, the invitation should be resent by: Text to cell phone: [phone]  Will anyone else be joining your video visit? No      Assessment & Plan     Severe motion sickness, initial encounter  Will refill patients medication. Discussed side effects of medication and to avoid alcohol use while taking Valium. Patient has no additional questions.   - scopolamine (TRANSDERM) 1 MG/3DAYS 72 hr patch; Place 1 patch onto the skin every 72 hours.  - diazepam (VALIUM) 5 MG tablet; Take 5 mg orally 30 minutes prior to flight                Subjective   Ayleen is a 44 year old, presenting for the following health issues:  Medication Request (Motion sickness medicine for flying- upcoming vacation soon)      8/29/2024     8:54 AM   Additional Questions   Roomed by An V.         8/29/2024     8:54 AM   Patient Reported Additional Medications   Patient reports taking the following new medications none     History of Present Illness       Reason for visit:  Taking an upcoming vacation in October requiring flying. Seeking my severe motion sickness medicine prescribed for the flight there and back.   She is taking medications regularly.     Has an upcoming trip - to [facility]. Has used Valium and Scopolamine in the past for motion sickness. Is seeking a refill today.    PDMP checked. No controlled substances in the last 12 months.                 Objective           Vitals:  No vitals were obtained today due to virtual visit.    Physical Exam   GENERAL: alert and no distress  EYES: Eyes grossly normal to inspection.  No discharge or erythema, or obvious scleral/conjunctival abnormalities.  RESP: No audible wheeze, cough, or visible cyanosis.    SKIN: Visible skin clear. No significant rash, abnormal pigmentation or lesions.  NEURO: Cranial nerves grossly intact.  Mentation and  speech appropriate for age.  PSYCH: Appropriate affect, tone, and pace of words          Video-Visit Details    Type of service:  Video Visit   Originating Location (pt. Location): Home    Distant Location (provider location):  On-site  Platform used for Video Visit: Guerda  Signed Electronically by: Irina Mueller PA-C

## 2024-10-03 DIAGNOSIS — Z30.41 SURVEILLANCE OF PREVIOUSLY PRESCRIBED CONTRACEPTIVE PILL: ICD-10-CM

## 2024-10-04 RX ORDER — NORGESTIMATE AND ETHINYL ESTRADIOL
1 KIT DAILY
Qty: 84 TABLET | Refills: 0 | Status: SHIPPED | OUTPATIENT
Start: 2024-10-04

## 2025-01-08 ENCOUNTER — MYC MEDICAL ADVICE (OUTPATIENT)
Dept: FAMILY MEDICINE | Facility: CLINIC | Age: 45
End: 2025-01-08
Payer: COMMERCIAL

## 2025-01-08 ENCOUNTER — MYC REFILL (OUTPATIENT)
Dept: FAMILY MEDICINE | Facility: CLINIC | Age: 45
End: 2025-01-08
Payer: COMMERCIAL

## 2025-01-08 DIAGNOSIS — Z30.41 SURVEILLANCE OF PREVIOUSLY PRESCRIBED CONTRACEPTIVE PILL: ICD-10-CM

## 2025-01-08 RX ORDER — NORGESTIMATE AND ETHINYL ESTRADIOL 7DAYSX3 LO
1 KIT ORAL DAILY
Qty: 84 TABLET | Refills: 0 | OUTPATIENT
Start: 2025-01-08

## 2025-01-08 RX ORDER — NORGESTIMATE AND ETHINYL ESTRADIOL
1 KIT DAILY
Qty: 84 TABLET | Refills: 3 | Status: SHIPPED | OUTPATIENT
Start: 2025-01-08

## 2025-01-08 NOTE — TELEPHONE ENCOUNTER
[facility] Medical Advice  Open  1/8/2025  [facility]       Dipti Callahan MD  Family Medicine     All Conversations: Birth Control Refill Needed  (Newest Message First)January 8, 2025     CW    1/8/25  1:21 PM  Margaret Coleman CMA routed this conversation to [facility]  Ayleen Ramos to [facility] [place] (supporting Dipti Carrizales MD)         1/8/25 12:49 PM  Congratulations on the birth of your recent child. I have been informed you are back from your recent maternity leave.   While out I was provided the name of a [place] colleague, NP Imelda France regarding a virtual appointment to refill my birth control (Tri-Lo-Dipti). There appeared to be confusion, and she only refilled my prescription for one month. Then I had to meet with Dr. Alejandra Queen to refill past the one month, but unfortunately, I have just been informed by the pharmacy she only refilled it for a three-month supply.   The pharmacy needs another refill notification sent to continue forward. I was hoping now you are back I could request that directly  through you. Can it also be for more than one month or a three-month supply. It gets a bit frustration to go through this process every refill. I understand it was probably because they were just backing you up and not my normal general care physician, but they both lead me to believe the refills were for a longer period of time.  Thank you for your time in this matter. Welcome back.

## 2025-01-08 NOTE — TELEPHONE ENCOUNTER
[employer] message sent to patient.     Thanks,  MAXIM Gunn  [facility]

## 2025-01-08 NOTE — TELEPHONE ENCOUNTER
Patient was hoping for multiple refills with this prescription.     Thanks,  Liliana Pleitez, PharmD [facility] [place]  Pharmacy Manager  January 8, 2025

## 2025-07-19 ENCOUNTER — HEALTH MAINTENANCE LETTER (OUTPATIENT)
Age: 45
End: 2025-07-19